# Patient Record
Sex: FEMALE | Race: WHITE | Employment: OTHER
[De-identification: names, ages, dates, MRNs, and addresses within clinical notes are randomized per-mention and may not be internally consistent; named-entity substitution may affect disease eponyms.]

---

## 2017-01-15 RX ORDER — PANTOPRAZOLE SODIUM 20 MG/1
TABLET, DELAYED RELEASE ORAL
Qty: 90 TABLET | Refills: 0 | Status: SHIPPED | OUTPATIENT
Start: 2017-01-15 | End: 2017-04-21 | Stop reason: SDUPTHER

## 2017-01-24 ENCOUNTER — OFFICE VISIT (OUTPATIENT)
Dept: FAMILY MEDICINE CLINIC | Facility: CLINIC | Age: 62
End: 2017-01-24

## 2017-01-24 VITALS
WEIGHT: 114 LBS | SYSTOLIC BLOOD PRESSURE: 98 MMHG | BODY MASS INDEX: 21.54 KG/M2 | OXYGEN SATURATION: 97 % | HEART RATE: 77 BPM | DIASTOLIC BLOOD PRESSURE: 62 MMHG

## 2017-01-24 DIAGNOSIS — R05.9 COUGH: Primary | ICD-10-CM

## 2017-01-24 DIAGNOSIS — R06.02 SHORTNESS OF BREATH: ICD-10-CM

## 2017-01-24 DIAGNOSIS — M54.6 ACUTE BILATERAL THORACIC BACK PAIN: ICD-10-CM

## 2017-01-24 PROCEDURE — 99213 OFFICE O/P EST LOW 20 MIN: CPT | Performed by: FAMILY MEDICINE

## 2017-01-24 ASSESSMENT — ENCOUNTER SYMPTOMS: BACK PAIN: 1

## 2017-01-25 ENCOUNTER — TELEPHONE (OUTPATIENT)
Dept: FAMILY MEDICINE CLINIC | Facility: CLINIC | Age: 62
End: 2017-01-25

## 2017-02-05 ASSESSMENT — ENCOUNTER SYMPTOMS
WHEEZING: 0
SHORTNESS OF BREATH: 0
COUGH: 1
CONSTIPATION: 0
PHOTOPHOBIA: 0
ABDOMINAL DISTENTION: 0
VOMITING: 0
NAUSEA: 0
DIARRHEA: 0
ABDOMINAL PAIN: 0
TROUBLE SWALLOWING: 0
COLOR CHANGE: 0

## 2017-02-14 RX ORDER — TRAMADOL HYDROCHLORIDE 50 MG/1
50 TABLET ORAL EVERY 6 HOURS PRN
Qty: 120 TABLET | Refills: 0 | Status: SHIPPED | OUTPATIENT
Start: 2017-02-14 | End: 2017-02-28 | Stop reason: SDUPTHER

## 2017-02-28 RX ORDER — TRAMADOL HYDROCHLORIDE 50 MG/1
50 TABLET ORAL EVERY 6 HOURS PRN
Qty: 120 TABLET | Refills: 0 | Status: SHIPPED | OUTPATIENT
Start: 2017-02-28 | End: 2017-03-30 | Stop reason: SDUPTHER

## 2017-03-30 RX ORDER — TRAMADOL HYDROCHLORIDE 50 MG/1
50 TABLET ORAL EVERY 6 HOURS PRN
Qty: 120 TABLET | Refills: 0 | Status: SHIPPED | OUTPATIENT
Start: 2017-03-30 | End: 2017-05-09 | Stop reason: SDUPTHER

## 2017-04-10 ENCOUNTER — HOSPITAL ENCOUNTER (EMERGENCY)
Age: 62
Discharge: HOME | End: 2017-04-10
Attending: EMERGENCY MEDICINE
Payer: COMMERCIAL

## 2017-04-10 VITALS
OXYGEN SATURATION: 95 % | DIASTOLIC BLOOD PRESSURE: 85 MMHG | RESPIRATION RATE: 20 BRPM | TEMPERATURE: 97.7 F | SYSTOLIC BLOOD PRESSURE: 146 MMHG | HEART RATE: 69 BPM

## 2017-04-10 DIAGNOSIS — S01.01XA SCALP LACERATION, INITIAL ENCOUNTER: Primary | ICD-10-CM

## 2017-04-10 PROCEDURE — 90715 TDAP VACCINE 7 YRS/> IM: CPT | Performed by: EMERGENCY MEDICINE

## 2017-04-10 PROCEDURE — 6360000002 HC RX W HCPCS: Performed by: EMERGENCY MEDICINE

## 2017-04-10 PROCEDURE — 6370000000 HC RX 637 (ALT 250 FOR IP): Performed by: EMERGENCY MEDICINE

## 2017-04-10 PROCEDURE — 12001 RPR S/N/AX/GEN/TRNK 2.5CM/<: CPT

## 2017-04-10 PROCEDURE — 6370000000 HC RX 637 (ALT 250 FOR IP)

## 2017-04-10 PROCEDURE — 2500000003 HC RX 250 WO HCPCS: Performed by: EMERGENCY MEDICINE

## 2017-04-10 PROCEDURE — 90471 IMMUNIZATION ADMIN: CPT | Performed by: EMERGENCY MEDICINE

## 2017-04-10 PROCEDURE — 99282 EMERGENCY DEPT VISIT SF MDM: CPT

## 2017-04-10 RX ORDER — LIDOCAINE HYDROCHLORIDE 10 MG/ML
5 INJECTION, SOLUTION INFILTRATION; PERINEURAL ONCE
Status: COMPLETED | OUTPATIENT
Start: 2017-04-10 | End: 2017-04-10

## 2017-04-10 RX ORDER — BACITRACIN, NEOMYCIN, POLYMYXIN B 400; 3.5; 5 [USP'U]/G; MG/G; [USP'U]/G
OINTMENT TOPICAL ONCE
Status: COMPLETED | OUTPATIENT
Start: 2017-04-10 | End: 2017-04-10

## 2017-04-10 RX ORDER — ACETAMINOPHEN 325 MG/1
650 TABLET ORAL ONCE
Status: COMPLETED | OUTPATIENT
Start: 2017-04-10 | End: 2017-04-10

## 2017-04-10 RX ORDER — ACETAMINOPHEN 325 MG/1
TABLET ORAL
Status: COMPLETED
Start: 2017-04-10 | End: 2017-04-10

## 2017-04-10 RX ADMIN — BACITRACIN, NEOMYCIN, POLYMYXIN B 3.5 G: 400; 3.5; 5 OINTMENT TOPICAL at 21:29

## 2017-04-10 RX ADMIN — TETANUS TOXOID, REDUCED DIPHTHERIA TOXOID AND ACELLULAR PERTUSSIS VACCINE, ADSORBED 0.5 ML: 5; 2.5; 8; 8; 2.5 SUSPENSION INTRAMUSCULAR at 21:13

## 2017-04-10 RX ADMIN — ACETAMINOPHEN 650 MG: 325 TABLET ORAL at 22:30

## 2017-04-10 RX ADMIN — ACETAMINOPHEN 650 MG: 325 TABLET, FILM COATED ORAL at 22:30

## 2017-04-10 RX ADMIN — LIDOCAINE HYDROCHLORIDE 5 ML: 10 INJECTION, SOLUTION INFILTRATION; PERINEURAL at 21:28

## 2017-04-10 ASSESSMENT — PAIN SCALES - GENERAL
PAINLEVEL_OUTOF10: 0
PAINLEVEL_OUTOF10: 2

## 2017-04-21 RX ORDER — PANTOPRAZOLE SODIUM 20 MG/1
TABLET, DELAYED RELEASE ORAL
Qty: 90 TABLET | Refills: 2 | Status: SHIPPED | OUTPATIENT
Start: 2017-04-21 | End: 2017-12-06 | Stop reason: SDUPTHER

## 2017-05-28 RX ORDER — AMLODIPINE BESYLATE 5 MG/1
TABLET ORAL
Qty: 90 TABLET | Refills: 1 | Status: SHIPPED | OUTPATIENT
Start: 2017-05-28 | End: 2017-11-10 | Stop reason: SDUPTHER

## 2017-06-28 RX ORDER — TRAMADOL HYDROCHLORIDE 50 MG/1
TABLET ORAL
Qty: 120 TABLET | Refills: 1 | Status: SHIPPED | OUTPATIENT
Start: 2017-06-28 | End: 2017-07-27 | Stop reason: SDUPTHER

## 2017-07-03 ENCOUNTER — OFFICE VISIT (OUTPATIENT)
Dept: FAMILY MEDICINE CLINIC | Age: 62
End: 2017-07-03
Payer: COMMERCIAL

## 2017-07-03 VITALS
TEMPERATURE: 98.1 F | SYSTOLIC BLOOD PRESSURE: 128 MMHG | OXYGEN SATURATION: 95 % | HEART RATE: 62 BPM | DIASTOLIC BLOOD PRESSURE: 70 MMHG | WEIGHT: 108 LBS | HEIGHT: 61 IN | BODY MASS INDEX: 20.39 KG/M2

## 2017-07-03 DIAGNOSIS — L24.9 IRRITANT CONTACT DERMATITIS, UNSPECIFIED TRIGGER: Primary | ICD-10-CM

## 2017-07-03 PROCEDURE — 99213 OFFICE O/P EST LOW 20 MIN: CPT | Performed by: NURSE PRACTITIONER

## 2017-07-03 RX ORDER — PREDNISONE 20 MG/1
40 TABLET ORAL DAILY
Qty: 6 TABLET | Refills: 0 | Status: SHIPPED | OUTPATIENT
Start: 2017-07-03 | End: 2017-07-06

## 2017-07-03 ASSESSMENT — ENCOUNTER SYMPTOMS
SHORTNESS OF BREATH: 0
COUGH: 0

## 2017-07-07 ENCOUNTER — OFFICE VISIT (OUTPATIENT)
Dept: FAMILY MEDICINE CLINIC | Age: 62
End: 2017-07-07
Payer: COMMERCIAL

## 2017-07-07 VITALS
OXYGEN SATURATION: 97 % | DIASTOLIC BLOOD PRESSURE: 70 MMHG | WEIGHT: 112 LBS | BODY MASS INDEX: 21.16 KG/M2 | HEART RATE: 84 BPM | TEMPERATURE: 98.2 F | SYSTOLIC BLOOD PRESSURE: 122 MMHG

## 2017-07-07 DIAGNOSIS — L24.89 IRRITANT CONTACT DERMATITIS DUE TO OTHER AGENTS: Primary | ICD-10-CM

## 2017-07-07 PROCEDURE — 99213 OFFICE O/P EST LOW 20 MIN: CPT | Performed by: NURSE PRACTITIONER

## 2017-07-07 ASSESSMENT — ENCOUNTER SYMPTOMS: COUGH: 0

## 2017-07-27 RX ORDER — TRAMADOL HYDROCHLORIDE 50 MG/1
50 TABLET ORAL EVERY 6 HOURS PRN
Qty: 120 TABLET | Refills: 1 | Status: SHIPPED | OUTPATIENT
Start: 2017-07-27 | End: 2017-10-25 | Stop reason: SDUPTHER

## 2017-07-31 ENCOUNTER — HOSPITAL ENCOUNTER (EMERGENCY)
Age: 62
Discharge: HOME | End: 2017-07-31
Attending: FAMILY MEDICINE
Payer: COMMERCIAL

## 2017-07-31 ENCOUNTER — TELEPHONE (OUTPATIENT)
Dept: FAMILY MEDICINE CLINIC | Age: 62
End: 2017-07-31

## 2017-07-31 ENCOUNTER — OFFICE VISIT (OUTPATIENT)
Dept: PRIMARY CARE CLINIC | Age: 62
End: 2017-07-31
Payer: COMMERCIAL

## 2017-07-31 VITALS
BODY MASS INDEX: 20.77 KG/M2 | RESPIRATION RATE: 18 BRPM | WEIGHT: 110 LBS | DIASTOLIC BLOOD PRESSURE: 94 MMHG | HEART RATE: 70 BPM | TEMPERATURE: 98.2 F | SYSTOLIC BLOOD PRESSURE: 151 MMHG | OXYGEN SATURATION: 97 % | HEIGHT: 61 IN

## 2017-07-31 DIAGNOSIS — R10.31 GROIN PAIN, RIGHT: ICD-10-CM

## 2017-07-31 DIAGNOSIS — R10.31 RIGHT LOWER QUADRANT ABDOMINAL PAIN: Primary | ICD-10-CM

## 2017-07-31 DIAGNOSIS — Z53.21 PATIENT LEFT WITHOUT BEING SEEN: Primary | ICD-10-CM

## 2017-07-31 LAB
BILIRUBIN, POC: ABNORMAL
BLOOD URINE, POC: ABNORMAL
CLARITY, POC: CLEAR
COLOR, POC: YELLOW
GLUCOSE URINE, POC: ABNORMAL
KETONES, POC: ABNORMAL
LEUKOCYTE EST, POC: ABNORMAL
NITRITE, POC: ABNORMAL
PH, POC: 5.5
PROTEIN, POC: ABNORMAL
SPECIFIC GRAVITY, POC: 1
UROBILINOGEN, POC: 0.2

## 2017-07-31 PROCEDURE — 81003 URINALYSIS AUTO W/O SCOPE: CPT | Performed by: NURSE PRACTITIONER

## 2017-07-31 RX ORDER — CYCLOBENZAPRINE HCL 5 MG
5 TABLET ORAL 3 TIMES DAILY PRN
Qty: 10 TABLET | Refills: 0 | Status: SHIPPED | OUTPATIENT
Start: 2017-07-31 | End: 2017-08-10

## 2017-07-31 ASSESSMENT — ENCOUNTER SYMPTOMS
BACK PAIN: 0
SORE THROAT: 0
CONSTIPATION: 1
RHINORRHEA: 0
SHORTNESS OF BREATH: 0
DIARRHEA: 0
WHEEZING: 0
COUGH: 0
VOMITING: 1
ABDOMINAL PAIN: 0
NAUSEA: 0

## 2017-08-02 ENCOUNTER — HOSPITAL ENCOUNTER (OUTPATIENT)
Age: 62
Discharge: HOME | End: 2017-08-02
Payer: COMMERCIAL

## 2017-08-02 ENCOUNTER — HOSPITAL ENCOUNTER (OUTPATIENT)
Age: 62
End: 2017-08-02
Payer: COMMERCIAL

## 2017-08-02 ENCOUNTER — HOSPITAL ENCOUNTER (OUTPATIENT)
Dept: CT IMAGING | Age: 62
Discharge: HOME | End: 2017-08-02
Payer: COMMERCIAL

## 2017-08-02 ENCOUNTER — HOSPITAL ENCOUNTER (OUTPATIENT)
Dept: MAMMOGRAPHY | Age: 62
Discharge: HOME | End: 2017-08-02
Payer: COMMERCIAL

## 2017-08-02 ENCOUNTER — OFFICE VISIT (OUTPATIENT)
Dept: FAMILY MEDICINE CLINIC | Age: 62
End: 2017-08-02
Payer: COMMERCIAL

## 2017-08-02 VITALS
OXYGEN SATURATION: 96 % | DIASTOLIC BLOOD PRESSURE: 68 MMHG | BODY MASS INDEX: 20.41 KG/M2 | WEIGHT: 108 LBS | HEART RATE: 72 BPM | SYSTOLIC BLOOD PRESSURE: 104 MMHG

## 2017-08-02 DIAGNOSIS — R10.33 PERIUMBILICAL ABDOMINAL PAIN: ICD-10-CM

## 2017-08-02 DIAGNOSIS — R10.33 PERIUMBILICAL ABDOMINAL PAIN: Primary | ICD-10-CM

## 2017-08-02 DIAGNOSIS — Z12.39 SCREENING FOR BREAST CANCER: ICD-10-CM

## 2017-08-02 DIAGNOSIS — L98.9 SKIN LESION: ICD-10-CM

## 2017-08-02 LAB
ABSOLUTE EOS #: 0.2 K/UL (ref 0–0.4)
ABSOLUTE LYMPH #: 2.5 K/UL (ref 1.1–2.7)
ABSOLUTE MONO #: 0.5 K/UL (ref 0–1)
ALBUMIN SERPL-MCNC: 4.1 G/DL (ref 3.5–5.2)
ALBUMIN/GLOBULIN RATIO: ABNORMAL (ref 1–2.5)
ALP BLD-CCNC: 144 U/L (ref 35–104)
ALT SERPL-CCNC: 8 U/L (ref 5–33)
ANION GAP SERPL CALCULATED.3IONS-SCNC: 11 MMOL/L (ref 9–17)
AST SERPL-CCNC: 25 U/L
BASOPHILS # BLD: 1 %
BASOPHILS ABSOLUTE: 0 K/UL (ref 0–0.2)
BILIRUB SERPL-MCNC: 0.29 MG/DL (ref 0.3–1.2)
BUN BLDV-MCNC: 14 MG/DL (ref 8–23)
BUN/CREAT BLD: 25 (ref 9–20)
CALCIUM SERPL-MCNC: 9.4 MG/DL (ref 8.6–10.4)
CHLORIDE BLD-SCNC: 98 MMOL/L (ref 98–107)
CO2: 28 MMOL/L (ref 20–31)
CREAT SERPL-MCNC: 0.56 MG/DL (ref 0.5–0.9)
DIFFERENTIAL TYPE: YES
EOSINOPHILS RELATIVE PERCENT: 2 %
GFR AFRICAN AMERICAN: >60 ML/MIN
GFR NON-AFRICAN AMERICAN: >60 ML/MIN
GFR SERPL CREATININE-BSD FRML MDRD: ABNORMAL ML/MIN/{1.73_M2}
GFR SERPL CREATININE-BSD FRML MDRD: ABNORMAL ML/MIN/{1.73_M2}
GLUCOSE BLD-MCNC: 100 MG/DL (ref 70–99)
HCT VFR BLD CALC: 51.3 % (ref 36–46)
HEMOGLOBIN: 17.3 G/DL (ref 12–16)
LYMPHOCYTES # BLD: 34 %
MCH RBC QN AUTO: 31.4 PG (ref 26–34)
MCHC RBC AUTO-ENTMCNC: 33.6 G/DL (ref 31–37)
MCV RBC AUTO: 93.4 FL (ref 80–100)
MONOCYTES # BLD: 7 %
PDW BLD-RTO: 14.1 % (ref 12.1–15.2)
PLATELET # BLD: 244 K/UL (ref 140–450)
PLATELET ESTIMATE: ABNORMAL
PMV BLD AUTO: ABNORMAL FL (ref 6–12)
POTASSIUM SERPL-SCNC: 3.8 MMOL/L (ref 3.7–5.3)
RBC # BLD: 5.5 M/UL (ref 4–5.2)
RBC # BLD: ABNORMAL 10*6/UL
SEG NEUTROPHILS: 56 %
SEGMENTED NEUTROPHILS ABSOLUTE COUNT: 4.2 K/UL (ref 2.5–7)
SODIUM BLD-SCNC: 137 MMOL/L (ref 135–144)
TOTAL PROTEIN: 7.4 G/DL (ref 6.4–8.3)
WBC # BLD: 7.4 K/UL (ref 3.5–11)
WBC # BLD: ABNORMAL 10*3/UL

## 2017-08-02 PROCEDURE — 99213 OFFICE O/P EST LOW 20 MIN: CPT | Performed by: FAMILY MEDICINE

## 2017-08-02 PROCEDURE — 36415 COLL VENOUS BLD VENIPUNCTURE: CPT

## 2017-08-02 PROCEDURE — 80053 COMPREHEN METABOLIC PANEL: CPT

## 2017-08-02 PROCEDURE — 85025 COMPLETE CBC W/AUTO DIFF WBC: CPT

## 2017-08-02 PROCEDURE — G0202 SCR MAMMO BI INCL CAD: HCPCS

## 2017-08-02 PROCEDURE — 74177 CT ABD & PELVIS W/CONTRAST: CPT

## 2017-08-02 PROCEDURE — 6360000004 HC RX CONTRAST MEDICATION: Performed by: FAMILY MEDICINE

## 2017-08-02 RX ADMIN — IOVERSOL 75 ML: 741 INJECTION INTRA-ARTERIAL; INTRAVENOUS at 13:41

## 2017-08-02 ASSESSMENT — ENCOUNTER SYMPTOMS
HEMATOCHEZIA: 0
VOMITING: 0
DIARRHEA: 0
ABDOMINAL PAIN: 1
CONSTIPATION: 1
NAUSEA: 0

## 2017-08-03 ENCOUNTER — TELEPHONE (OUTPATIENT)
Dept: FAMILY MEDICINE CLINIC | Age: 62
End: 2017-08-03

## 2017-08-03 DIAGNOSIS — D58.2 ELEVATED HEMOGLOBIN (HCC): ICD-10-CM

## 2017-08-03 DIAGNOSIS — R74.8 ELEVATED ALKALINE PHOSPHATASE LEVEL: Primary | ICD-10-CM

## 2017-08-04 ENCOUNTER — TELEPHONE (OUTPATIENT)
Dept: FAMILY MEDICINE CLINIC | Age: 62
End: 2017-08-04

## 2017-08-09 ENCOUNTER — TELEPHONE (OUTPATIENT)
Dept: FAMILY MEDICINE CLINIC | Age: 62
End: 2017-08-09

## 2017-08-09 RX ORDER — METHYLPREDNISOLONE 4 MG/1
TABLET ORAL
Qty: 21 TABLET | Refills: 0 | Status: SHIPPED | OUTPATIENT
Start: 2017-08-09 | End: 2017-08-15

## 2017-08-13 ASSESSMENT — ENCOUNTER SYMPTOMS
ABDOMINAL DISTENTION: 0
SHORTNESS OF BREATH: 0
COUGH: 0
BACK PAIN: 0
TROUBLE SWALLOWING: 0
PHOTOPHOBIA: 0
COLOR CHANGE: 0
WHEEZING: 0

## 2017-10-25 RX ORDER — TRAMADOL HYDROCHLORIDE 50 MG/1
50 TABLET ORAL EVERY 6 HOURS PRN
Qty: 120 TABLET | Refills: 1 | Status: SHIPPED | OUTPATIENT
Start: 2017-10-25 | End: 2017-12-06 | Stop reason: SDUPTHER

## 2017-10-25 NOTE — TELEPHONE ENCOUNTER
Patient called in asking for Ultram      Please send to    Last OV:08/02/2017    Future appointment: NONE    [phone]- please let patient know    Health Maintenance   Topic Date Due    Hepatitis C screen  1955    HIV screen  01/10/1969    Pneumococcal med risk (1 of 1 - PPSV23) 01/10/1973    Cervical cancer screen  01/10/1975    Lipid screen  01/10/1994    Zostavax vaccine  01/10/2014    Flu vaccine (1) 09/01/2017    Breast cancer screen  08/02/2019    DTaP/Tdap/Td vaccine (2 - Td) 04/10/2027    Colon cancer screen colonoscopy  05/22/2027             (applicable per patient's age: Cancer Screenings, Depression Screening, Fall Risk Screening, Immunizations)    AST (U/L)   Date Value   08/02/2017 25     ALT (U/L)   Date Value   08/02/2017 8     BUN (mg/dL)   Date Value   08/02/2017 14      (goal A1C is < 7)   (goal LDL is <100) need 30-50% reduction from baseline     BP Readings from Last 3 Encounters:   08/02/17 104/68   07/31/17 (!) 151/94   07/07/17 122/70    (goal /80)      All Future Testing planned in [facility]:  Lab Frequency Next Occurrence   Hepatic Function Panel Once 08/03/2017       Next Visit Date:  No future appointments.          Patient Active Problem List:     Hydronephrosis, right     Dysthymia     Arthritis     Amaurosis fugax     History of laser assisted in situ keratomileusis

## 2017-11-10 RX ORDER — TRAMADOL HYDROCHLORIDE 50 MG/1
50 TABLET ORAL EVERY 6 HOURS PRN
Qty: 120 TABLET | Refills: 0 | Status: SHIPPED | OUTPATIENT
Start: 2017-11-10 | End: 2017-12-06 | Stop reason: SDUPTHER

## 2017-11-10 RX ORDER — AMLODIPINE BESYLATE 5 MG/1
TABLET ORAL
Qty: 90 TABLET | Refills: 1 | Status: SHIPPED | OUTPATIENT
Start: 2017-11-10 | End: 2017-12-06 | Stop reason: SDUPTHER

## 2017-11-10 NOTE — TELEPHONE ENCOUNTER
Looks like on 10-25-17 a rx was sent to [facility] but [facility] always uses [facility].    The RX was not filled at  so this is now pending to [facility]

## 2017-12-06 ENCOUNTER — OFFICE VISIT (OUTPATIENT)
Dept: FAMILY MEDICINE CLINIC | Age: 62
End: 2017-12-06
Payer: COMMERCIAL

## 2017-12-06 VITALS
DIASTOLIC BLOOD PRESSURE: 78 MMHG | SYSTOLIC BLOOD PRESSURE: 120 MMHG | BODY MASS INDEX: 20.78 KG/M2 | WEIGHT: 110 LBS | HEART RATE: 70 BPM | OXYGEN SATURATION: 97 %

## 2017-12-06 DIAGNOSIS — J01.40 ACUTE NON-RECURRENT PANSINUSITIS: ICD-10-CM

## 2017-12-06 DIAGNOSIS — J40 BRONCHITIS: Primary | ICD-10-CM

## 2017-12-06 PROCEDURE — 99213 OFFICE O/P EST LOW 20 MIN: CPT | Performed by: FAMILY MEDICINE

## 2017-12-06 RX ORDER — AMLODIPINE BESYLATE 5 MG/1
TABLET ORAL
Qty: 90 TABLET | Refills: 1 | Status: SHIPPED | OUTPATIENT
Start: 2017-12-06 | End: 2018-07-27 | Stop reason: SDUPTHER

## 2017-12-06 RX ORDER — BENZONATATE 100 MG/1
100 CAPSULE ORAL 3 TIMES DAILY PRN
Qty: 21 CAPSULE | Refills: 0 | Status: SHIPPED | OUTPATIENT
Start: 2017-12-06 | End: 2017-12-13

## 2017-12-06 RX ORDER — TRAMADOL HYDROCHLORIDE 50 MG/1
50 TABLET ORAL EVERY 6 HOURS PRN
Qty: 120 TABLET | Refills: 1 | Status: SHIPPED | OUTPATIENT
Start: 2017-12-06 | End: 2018-03-13 | Stop reason: SDUPTHER

## 2017-12-06 RX ORDER — PANTOPRAZOLE SODIUM 20 MG/1
TABLET, DELAYED RELEASE ORAL
Qty: 90 TABLET | Refills: 2 | Status: SHIPPED | OUTPATIENT
Start: 2017-12-06 | End: 2020-07-20 | Stop reason: SDUPTHER

## 2017-12-06 RX ORDER — AZITHROMYCIN 250 MG/1
TABLET, FILM COATED ORAL
Qty: 1 PACKET | Refills: 0 | Status: SHIPPED | OUTPATIENT
Start: 2017-12-06 | End: 2017-12-16

## 2017-12-17 ASSESSMENT — ENCOUNTER SYMPTOMS
NAUSEA: 0
SINUS PRESSURE: 1
COLOR CHANGE: 0
HOARSE VOICE: 0
SINUS PAIN: 1
DIARRHEA: 0
VOMITING: 0
WHEEZING: 0
RHINORRHEA: 1
PHOTOPHOBIA: 0
BACK PAIN: 0
ABDOMINAL DISTENTION: 0
SHORTNESS OF BREATH: 0
TROUBLE SWALLOWING: 0
COUGH: 1
CONSTIPATION: 0
ABDOMINAL PAIN: 0

## 2017-12-18 NOTE — PROGRESS NOTES
Negative for polydipsia, polyphagia and polyuria. Musculoskeletal: Negative for arthralgias, back pain, gait problem, joint swelling, myalgias, neck pain and neck stiffness. Skin: Negative for color change and rash. Neurological: Positive for headaches. Negative for dizziness, syncope, weakness and light-headedness. Physical Exam:     Physical Exam   Constitutional: She is oriented to person, place, and time. She appears well-developed and well-nourished. HENT:   Head: Normocephalic and atraumatic. Right Ear: External ear normal.   Left Ear: External ear normal.   Nose: Mucosal edema and rhinorrhea present. Right sinus exhibits maxillary sinus tenderness and frontal sinus tenderness. Left sinus exhibits maxillary sinus tenderness and frontal sinus tenderness. Eyes: Conjunctivae and EOM are normal.   Neck: Normal range of motion. Neck supple. No thyromegaly present. Cardiovascular: Normal rate, regular rhythm and normal heart sounds. Exam reveals no gallop and no friction rub. No murmur heard. Pulmonary/Chest: Effort normal. No respiratory distress. She has no wheezes. She has no rales. She exhibits no tenderness. Bronchial breath sounds with prolonged expiratory phase   Abdominal: Soft. Bowel sounds are normal. She exhibits no distension. There is no tenderness. There is no rebound and no guarding. Musculoskeletal: Normal range of motion. She exhibits no edema. Lymphadenopathy:     She has no cervical adenopathy. Neurological: She is alert and oriented to person, place, and time. She exhibits normal muscle tone. Coordination normal.   Skin: Skin is warm and dry. No rash noted. No erythema. Nursing note and vitals reviewed.       Vitals:  /78   Pulse 70   Wt 110 lb (49.9 kg)   LMP 11/25/2003 (Approximate)   SpO2 97%   BMI 20.78 kg/m²       Data:     Lab Results   Component Value Date     08/02/2017    K 3.8 08/02/2017    CL 98 08/02/2017    CO2 28 08/02/2017    BUN 14 08/02/2017    CREATININE 0.56 08/02/2017    GLUCOSE 100 08/02/2017    PROT 7.4 08/02/2017    LABALBU 4.1 08/02/2017    BILITOT 0.29 08/02/2017    ALKPHOS 144 08/02/2017    AST 25 08/02/2017    ALT 8 08/02/2017     Lab Results   Component Value Date    WBC 7.4 08/02/2017    RBC 5.50 08/02/2017    HGB 17.3 08/02/2017    HCT 51.3 08/02/2017    MCV 93.4 08/02/2017    MCH 31.4 08/02/2017    MCHC 33.6 08/02/2017    RDW 14.1 08/02/2017     08/02/2017    MPV NOT REPORTED 08/02/2017     Lab Results   Component Value Date    TSH 2.29 08/24/2015     No results found for: CHOL, HDL, PSA, LABA1C       Assessment:       1. Bronchitis     2. Acute non-recurrent pansinusitis         Plan:   1. Bronchitis-Rx azithromycin. Recommend Tylenol/ibuprofen as needed for pain/fever. Recommend increased fluid intake. Follow-up if not improving. 2.  Acute pansinusitis-Rx azithromycin, Rx Tessalon for symptomatic relief of cough. Recommend Tylenol/ibuprofen as needed for pain/fever. Recommend increased fluid intake. Follow-up if not improving. Refills of patient's chronic medications sent in for patient            Completed Refills   Requested Prescriptions     Signed Prescriptions Disp Refills    amLODIPine (NORVASC) 5 MG tablet 90 tablet 1     Sig: Take 1 tablet by mouth  daily    traMADol (ULTRAM) 50 MG tablet 120 tablet 1     Sig: Take 1 tablet by mouth every 6 hours as needed for Pain .  pantoprazole (PROTONIX) 20 MG tablet 90 tablet 2     Sig: Take 1 tablet by mouth  daily    azithromycin (ZITHROMAX) 250 MG tablet 1 packet 0     Sig: Take 2 tabs (500 mg) on Day 1, and take 1 tab (250 mg) on days 2 through 5.    benzonatate (TESSALON PERLES) 100 MG capsule 21 capsule 0     Sig: Take 1 capsule by mouth 3 times daily as needed for Cough     Return if symptoms worsen or fail to improve.   Orders Placed This Encounter   Medications    amLODIPine (NORVASC) 5 MG tablet     Sig: Take 1 tablet by mouth  daily

## 2018-02-13 ENCOUNTER — HOSPITAL ENCOUNTER (OUTPATIENT)
Dept: GENERAL RADIOLOGY | Age: 63
Discharge: HOME | End: 2018-02-15
Payer: COMMERCIAL

## 2018-02-13 ENCOUNTER — HOSPITAL ENCOUNTER (OUTPATIENT)
Age: 63
Discharge: HOME | End: 2018-02-15
Payer: COMMERCIAL

## 2018-02-13 ENCOUNTER — OFFICE VISIT (OUTPATIENT)
Dept: FAMILY MEDICINE CLINIC | Age: 63
End: 2018-02-13
Payer: COMMERCIAL

## 2018-02-13 VITALS
OXYGEN SATURATION: 97 % | SYSTOLIC BLOOD PRESSURE: 124 MMHG | WEIGHT: 110 LBS | BODY MASS INDEX: 20.78 KG/M2 | HEART RATE: 72 BPM | DIASTOLIC BLOOD PRESSURE: 70 MMHG

## 2018-02-13 DIAGNOSIS — R10.9 FLANK PAIN: Primary | ICD-10-CM

## 2018-02-13 DIAGNOSIS — R10.9 FLANK PAIN: ICD-10-CM

## 2018-02-13 LAB
BILIRUBIN, POC: NORMAL
BLOOD URINE, POC: NORMAL
CLARITY, POC: NORMAL
COLOR, POC: NORMAL
GLUCOSE URINE, POC: NORMAL
KETONES, POC: NORMAL
LEUKOCYTE EST, POC: NORMAL
NITRITE, POC: NORMAL
PH, POC: 7.5
PROTEIN, POC: NORMAL
SPECIFIC GRAVITY, POC: 1
UROBILINOGEN, POC: 0.2

## 2018-02-13 PROCEDURE — G0444 DEPRESSION SCREEN ANNUAL: HCPCS | Performed by: FAMILY MEDICINE

## 2018-02-13 PROCEDURE — 99213 OFFICE O/P EST LOW 20 MIN: CPT | Performed by: FAMILY MEDICINE

## 2018-02-13 PROCEDURE — 74018 RADEX ABDOMEN 1 VIEW: CPT

## 2018-02-13 PROCEDURE — 81003 URINALYSIS AUTO W/O SCOPE: CPT | Performed by: FAMILY MEDICINE

## 2018-02-13 PROCEDURE — 73502 X-RAY EXAM HIP UNI 2-3 VIEWS: CPT

## 2018-02-13 ASSESSMENT — PATIENT HEALTH QUESTIONNAIRE - PHQ9
1. LITTLE INTEREST OR PLEASURE IN DOING THINGS: 2
SUM OF ALL RESPONSES TO PHQ9 QUESTIONS 1 & 2: 4
2. FEELING DOWN, DEPRESSED OR HOPELESS: 2
SUM OF ALL RESPONSES TO PHQ QUESTIONS 1-9: 4

## 2018-02-13 NOTE — LETTER
[address]  [place]  [place] [address]  Phone: [phone]  Fax: [phone] [facility],         February 13, 2018     Patient: Ronna Morris   YOB: 1955   Date of Visit: 2/13/2018       To Whom It May Concern: It is my medical opinion that Livan Kaur has significant back pain which would make it difficult for her to serve on jury duty. If you have any questions or concerns, please don't hesitate to call.     Sincerely,        Fam Garcia, DO

## 2018-02-13 NOTE — LETTER
[address]  [place] [address]  Phone: [phone]  Fax: [phone] [facility],         February 13, 2018     Patient: Cynthia Matta   YOB: 1955   Date of Visit: 2/13/2018       To Whom It May Concern: It is my medical opinion that Ashwini Ibarra has significant back pakin which would make it difficult for her to serve on jury duty. .    If you have any questions or concerns, please don't hesitate to call.     Sincerely,        Tri Abreu DO

## 2018-02-15 ENCOUNTER — TELEPHONE (OUTPATIENT)
Dept: FAMILY MEDICINE CLINIC | Age: 63
End: 2018-02-15

## 2018-02-25 ASSESSMENT — ENCOUNTER SYMPTOMS
COLOR CHANGE: 0
WHEEZING: 0
COUGH: 0
ABDOMINAL DISTENTION: 0
DIARRHEA: 0
CONSTIPATION: 0
ABDOMINAL PAIN: 0
NAUSEA: 0
TROUBLE SWALLOWING: 0
PHOTOPHOBIA: 0
SHORTNESS OF BREATH: 0
VOMITING: 0
BACK PAIN: 0

## 2018-02-26 NOTE — PROGRESS NOTES
HPI Notes    Name: Jose A Collins  : 1955         Chief Complaint:     Chief Complaint   Patient presents with    Flank Pain     right       History of Present Illness:      Jose A Collins is a 58 y.o.  female who presents with Flank Pain (right)      Flank Pain   This is a new problem. The current episode started 1 to 4 weeks ago. The problem occurs 2 to 4 times per day. The problem has been waxing and waning since onset. The pain is present in the lumbar spine. The quality of the pain is described as aching and cramping. The pain does not radiate. The pain is mild. The pain is the same all the time. The symptoms are aggravated by twisting, bending and position. Pertinent negatives include no abdominal pain, chest pain, fever, headaches, leg pain, numbness, paresis, paresthesias or weakness. She has tried analgesics for the symptoms. The treatment provided mild relief.      No other acute problems or complaints  Past Medical History:     Past Medical History:   Diagnosis Date    Arthritis     Chronic kidney disease     Hydronephrosis    Depression     ON RX    Full dentures     Heart murmur     Hypertension     Scoliosis     chronic back pain    UPJ (ureteropelvic junction) obstruction 10/2014      Reviewed all health maintenance requirements and ordered appropriate tests  Health Maintenance Due   Topic Date Due    Hepatitis C screen  1955    HIV screen  01/10/1969    Pneumococcal med risk (1 of 1 - PPSV23) 01/10/1973    Cervical cancer screen  01/10/1975    Lipid screen  01/10/1994    Shingles Vaccine (1 of 2 - 2 Dose Series) 01/10/2004    Smoker: low dose lung CT screening  01/10/2009       Past Surgical History:     Past Surgical History:   Procedure Laterality Date    CARPAL TUNNEL RELEASE Bilateral      SECTION      COLONOSCOPY      Multiple     CYSTOSCOPY Right 2014    rt stent with right pyeloplasty    CYSTOURETHROSCOPY Bilateral 2014 with right stent exchange and retro pylogram    HAND SURGERY Right 2001    crushed    LASIK Bilateral 1994    OTHER SURGICAL HISTORY  10-16-14    Cysto; right ureteral retrograde pyelogram & stent insertion    OTHER SURGICAL HISTORY  1993    TUBAL LIGATION REVERSAL    TUBAL LIGATION  1973    VARICOSE VEIN SURGERY Bilateral         Medications:       Prior to Admission medications    Medication Sig Start Date End Date Taking? Authorizing Provider   amLODIPine (NORVASC) 5 MG tablet Take 1 tablet by mouth  daily 12/6/17  Yes Anant Shanelle, DO   traMADol (ULTRAM) 50 MG tablet Take 1 tablet by mouth every 6 hours as needed for Pain . 12/6/17  Yes Anant Shanelle, DO   pantoprazole (PROTONIX) 20 MG tablet Take 1 tablet by mouth  daily 12/6/17  Yes Anant Shanelle, DO   docusate sodium (COLACE) 100 MG capsule Take 1 capsule by mouth daily as needed for Constipation. 11/26/14  Yes Diamond Ly MD        Allergies:       Patient has no known allergies. Social History:     Tobacco:    reports that she has been smoking Cigarettes. She has a 20.00 pack-year smoking history. She has never used smokeless tobacco.  Alcohol:      reports that she drinks alcohol. Drug Use:  reports that she does not use drugs. Family History:     Family History   Problem Relation Age of Onset    Cancer Mother      COLON    Kidney Disease Father      ON DIALYSIS    Cancer Paternal Grandmother      SKIN       Review of Systems:     Positive and Negative as described in HPI    Review of Systems   Constitutional: Negative for activity change, appetite change, fever and unexpected weight change. HENT: Negative for ear discharge, ear pain and trouble swallowing. Eyes: Negative for photophobia and visual disturbance. Respiratory: Negative for cough, shortness of breath and wheezing. Cardiovascular: Negative for chest pain and palpitations.    Gastrointestinal: Negative for abdominal distention, abdominal pain, constipation, diarrhea, nausea and vomiting. Endocrine: Negative for polydipsia, polyphagia and polyuria. Genitourinary: Positive for flank pain. Musculoskeletal: Negative for arthralgias, back pain, gait problem, joint swelling, myalgias, neck pain and neck stiffness. Skin: Negative for color change and rash. Neurological: Negative for dizziness, syncope, weakness, light-headedness, numbness, headaches and paresthesias. Psychiatric/Behavioral: Negative for dysphoric mood. The patient is not nervous/anxious. Physical Exam:     Physical Exam   Constitutional: She is oriented to person, place, and time. She appears well-developed and well-nourished. HENT:   Head: Normocephalic and atraumatic. Right Ear: External ear normal.   Left Ear: External ear normal.   Eyes: Conjunctivae and EOM are normal.   Neck: Normal range of motion. Neck supple. No thyromegaly present. Cardiovascular: Normal rate, regular rhythm and normal heart sounds. Exam reveals no gallop and no friction rub. No murmur heard. Pulmonary/Chest: Effort normal and breath sounds normal. No respiratory distress. She has no wheezes. She has no rales. She exhibits no tenderness. Abdominal: Soft. Bowel sounds are normal. She exhibits no distension. There is no tenderness. There is no rebound and no guarding. Musculoskeletal: Normal range of motion. She exhibits no edema. Lymphadenopathy:     She has no cervical adenopathy. Neurological: She is alert and oriented to person, place, and time. She exhibits normal muscle tone. Coordination normal.   Skin: Skin is warm and dry. No rash noted. No erythema. Psychiatric: She has a normal mood and affect. Her behavior is normal. Judgment and thought content normal.   Nursing note and vitals reviewed.       Vitals:  /70   Pulse 72   Wt 110 lb (49.9 kg)   LMP 11/25/2003 (Approximate)   SpO2 97%   BMI 20.78 kg/m²       Data:     Lab Results   Component Value Date     08/02/2017    K 3.8 08/02/2017    CL 98 08/02/2017    CO2 28 08/02/2017    BUN 14 08/02/2017    CREATININE 0.56 08/02/2017    GLUCOSE 100 08/02/2017    PROT 7.4 08/02/2017    LABALBU 4.1 08/02/2017    BILITOT 0.29 08/02/2017    ALKPHOS 144 08/02/2017    AST 25 08/02/2017    ALT 8 08/02/2017     Lab Results   Component Value Date    WBC 7.4 08/02/2017    RBC 5.50 08/02/2017    HGB 17.3 08/02/2017    HCT 51.3 08/02/2017    MCV 93.4 08/02/2017    MCH 31.4 08/02/2017    MCHC 33.6 08/02/2017    RDW 14.1 08/02/2017     08/02/2017    MPV NOT REPORTED 08/02/2017     Lab Results   Component Value Date    TSH 2.29 08/24/2015     No results found for: CHOL, HDL, PSA, LABA1C       Assessment:       1. Flank pain  POCT Urinalysis No Micro (Auto)    XR ABDOMEN (KUB) (SINGLE AP VIEW)    XR HIP RIGHT (2-3 VIEWS)       Plan:   1. Flank pain-UA shows a small amount of blood, will get a KUB to evaluate and ensure there is no nephrolithiasis. Does not appear to be any signs of a urinary tract infection. Patient also with some chronic hip pain, a concern that this may be a manifestation of that, will get x-ray of the right hip to further evaluate. Recommend judicious use of NSAIDs. If workup is negative, it is likely that the patient has a muscle strain/sprain and heat is recommended along with rest and judicious use of NSAIDs. Management will depend upon patient's results. Completed Refills   Requested Prescriptions      No prescriptions requested or ordered in this encounter     No Follow-up on file. No orders of the defined types were placed in this encounter.     Orders Placed This Encounter   Procedures    XR ABDOMEN (KUB) (SINGLE AP VIEW)     Standing Status:   Future     Number of Occurrences:   1     Standing Expiration Date:   2/13/2019     Order Specific Question:   Reason for exam:     Answer:   right flank pain, hematuria    XR HIP RIGHT (2-3 VIEWS)     Standing Status:   Future     Number of Occurrences:   1     Standing Expiration Date:   2/13/2019     Order Specific Question:   Reason for exam:     Answer:   right hip pain, right flank pain    POCT Urinalysis No Micro (Auto)         Patient Instructions     SURVEY:    You may be receiving a survey from [employer] regarding your visit today. Please complete the survey to enable us to provide the highest quality of care to you and your family. If you cannot score us as very good on any question, please call the office to discuss how we could have made your experience exceptional.     Thank you. Electronically signed by Layne Willson DO on 2/25/2018 at 7:14 PM         Completed Refills   Requested Prescriptions      No prescriptions requested or ordered in this encounter         [facility] received counseling on the following healthy behaviors: nutrition and exercise  Reviewed prior labs and health maintenance. Continue current medications, diet and exercise. Discussed use, benefit, and side effects of prescribed medications. Barriers to medication compliance addressed. Patient given educational materials - see patient instructions. All patient questions answered. Patient voiced understanding.

## 2018-03-13 DIAGNOSIS — G89.29 CHRONIC BILATERAL LOW BACK PAIN WITHOUT SCIATICA: Primary | ICD-10-CM

## 2018-03-13 DIAGNOSIS — M54.50 CHRONIC BILATERAL LOW BACK PAIN WITHOUT SCIATICA: Primary | ICD-10-CM

## 2018-03-13 RX ORDER — TRAMADOL HYDROCHLORIDE 50 MG/1
50 TABLET ORAL EVERY 6 HOURS PRN
Qty: 120 TABLET | Refills: 1 | Status: SHIPPED | OUTPATIENT
Start: 2018-03-13 | End: 2018-05-16 | Stop reason: SDUPTHER

## 2018-05-14 ENCOUNTER — TELEPHONE (OUTPATIENT)
Dept: FAMILY MEDICINE CLINIC | Age: 63
End: 2018-05-14

## 2018-05-16 DIAGNOSIS — G89.29 CHRONIC BILATERAL LOW BACK PAIN WITHOUT SCIATICA: ICD-10-CM

## 2018-05-16 DIAGNOSIS — M54.50 CHRONIC BILATERAL LOW BACK PAIN WITHOUT SCIATICA: ICD-10-CM

## 2018-05-16 RX ORDER — TRAMADOL HYDROCHLORIDE 50 MG/1
50 TABLET ORAL EVERY 6 HOURS PRN
Qty: 120 TABLET | Refills: 0 | Status: SHIPPED | OUTPATIENT
Start: 2018-05-16 | End: 2018-07-11 | Stop reason: SDUPTHER

## 2018-05-16 RX ORDER — TRAMADOL HYDROCHLORIDE 50 MG/1
50 TABLET ORAL EVERY 6 HOURS PRN
Qty: 120 TABLET | Refills: 1 | Status: CANCELLED | OUTPATIENT
Start: 2018-05-16 | End: 2018-07-15

## 2018-07-11 ENCOUNTER — TELEPHONE (OUTPATIENT)
Dept: FAMILY MEDICINE CLINIC | Age: 63
End: 2018-07-11

## 2018-07-11 DIAGNOSIS — G89.29 CHRONIC BILATERAL LOW BACK PAIN WITHOUT SCIATICA: ICD-10-CM

## 2018-07-11 DIAGNOSIS — M54.50 CHRONIC BILATERAL LOW BACK PAIN WITHOUT SCIATICA: ICD-10-CM

## 2018-07-11 RX ORDER — TRAMADOL HYDROCHLORIDE 50 MG/1
50 TABLET ORAL EVERY 6 HOURS PRN
Qty: 60 TABLET | Refills: 0 | Status: SHIPPED | OUTPATIENT
Start: 2018-07-11 | End: 2018-08-10

## 2018-07-11 RX ORDER — TRAMADOL HYDROCHLORIDE 50 MG/1
50 TABLET ORAL EVERY 6 HOURS PRN
Qty: 120 TABLET | Refills: 0 | Status: SHIPPED | OUTPATIENT
Start: 2018-07-11 | End: 2018-08-15 | Stop reason: SDUPTHER

## 2018-07-11 NOTE — TELEPHONE ENCOUNTER
Patient asking for a new script for Tramadol - patient uses [facility] - doesn't feel that she needs to come in because Dr Chandler Mojica would just refill it for her    Health Maintenance   Topic Date Due    Hepatitis C screen  1955    HIV screen  01/10/1969    Pneumococcal med risk (1 of 1 - PPSV23) 01/10/1973    Cervical cancer screen  01/10/1975    Lipid screen  01/10/1994    Shingles Vaccine (1 of 2 - 2 Dose Series) 01/10/2004    Flu vaccine (1) 09/01/2018    Breast cancer screen  08/02/2019    DTaP/Tdap/Td vaccine (2 - Td) 04/10/2027    Colon cancer screen colonoscopy  05/22/2027             (applicable per patient's age: Cancer Screenings, Depression Screening, Fall Risk Screening, Immunizations)    AST (U/L)   Date Value   08/02/2017 25     ALT (U/L)   Date Value   08/02/2017 8     BUN (mg/dL)   Date Value   08/02/2017 14      (goal A1C is < 7)   (goal LDL is <100) need 30-50% reduction from baseline     BP Readings from Last 3 Encounters:   02/13/18 124/70   12/06/17 120/78   08/02/17 104/68    (goal /80)      All Future Testing planned in [facility]:  Lab Frequency Next Occurrence   Hepatic Function Panel Once 08/03/2017       Next Visit Date:  No future appointments.          Patient Active Problem List:     Hydronephrosis, right     Dysthymia     Arthritis     Amaurosis fugax     History of laser assisted in situ keratomileusis

## 2018-07-27 ENCOUNTER — TELEPHONE (OUTPATIENT)
Dept: FAMILY MEDICINE CLINIC | Age: 63
End: 2018-07-27

## 2018-07-27 RX ORDER — AMLODIPINE BESYLATE 5 MG/1
TABLET ORAL
Qty: 90 TABLET | Refills: 1 | Status: SHIPPED | OUTPATIENT
Start: 2018-07-27 | End: 2018-12-27 | Stop reason: SDUPTHER

## 2018-07-27 NOTE — TELEPHONE ENCOUNTER
[facility] left message requesting Amlodipine    Health Maintenance   Topic Date Due    Hepatitis C screen  1955    HIV screen  01/10/1969    Pneumococcal med risk (1 of 1 - PPSV23) 01/10/1973    Cervical cancer screen  01/10/1975    Lipid screen  01/10/1994    Shingles Vaccine (1 of 2 - 2 Dose Series) 01/10/2004    Flu vaccine (1) 09/01/2018    Breast cancer screen  08/02/2019    DTaP/Tdap/Td vaccine (2 - Td) 04/10/2027    Colon cancer screen colonoscopy  05/22/2027             (applicable per patient's age: Cancer Screenings, Depression Screening, Fall Risk Screening, Immunizations)    AST (U/L)   Date Value   08/02/2017 25     ALT (U/L)   Date Value   08/02/2017 8     BUN (mg/dL)   Date Value   08/02/2017 14      (goal A1C is < 7)   (goal LDL is <100) need 30-50% reduction from baseline     BP Readings from Last 3 Encounters:   02/13/18 124/70   12/06/17 120/78   08/02/17 104/68    (goal /80)      All Future Testing planned in [facility]:  Lab Frequency Next Occurrence   Hepatic Function Panel Once 08/03/2017       Next Visit Date:  Future Appointments  Date Time Provider Robert Francis   8/15/2018 1:00 PM DO Teja Mac [facility]            Patient Active Problem List:     Hydronephrosis, right     Dysthymia     Arthritis     Amaurosis fugax     History of laser assisted in situ keratomileusis

## 2018-08-15 ENCOUNTER — OFFICE VISIT (OUTPATIENT)
Dept: FAMILY MEDICINE CLINIC | Age: 63
End: 2018-08-15
Payer: COMMERCIAL

## 2018-08-15 ENCOUNTER — HOSPITAL ENCOUNTER (OUTPATIENT)
Age: 63
Discharge: HOME | End: 2018-08-17
Payer: COMMERCIAL

## 2018-08-15 ENCOUNTER — HOSPITAL ENCOUNTER (OUTPATIENT)
Dept: GENERAL RADIOLOGY | Age: 63
Discharge: HOME | End: 2018-08-17
Payer: COMMERCIAL

## 2018-08-15 VITALS
SYSTOLIC BLOOD PRESSURE: 120 MMHG | HEIGHT: 61 IN | WEIGHT: 113 LBS | DIASTOLIC BLOOD PRESSURE: 70 MMHG | BODY MASS INDEX: 21.34 KG/M2

## 2018-08-15 DIAGNOSIS — M54.50 CHRONIC BILATERAL LOW BACK PAIN WITHOUT SCIATICA: ICD-10-CM

## 2018-08-15 DIAGNOSIS — M41.25 OTHER IDIOPATHIC SCOLIOSIS, THORACOLUMBAR REGION: ICD-10-CM

## 2018-08-15 DIAGNOSIS — M54.50 CHRONIC BILATERAL LOW BACK PAIN WITHOUT SCIATICA: Primary | ICD-10-CM

## 2018-08-15 DIAGNOSIS — G89.29 CHRONIC BILATERAL LOW BACK PAIN WITHOUT SCIATICA: ICD-10-CM

## 2018-08-15 DIAGNOSIS — G89.29 CHRONIC BILATERAL LOW BACK PAIN WITHOUT SCIATICA: Primary | ICD-10-CM

## 2018-08-15 PROCEDURE — 72072 X-RAY EXAM THORAC SPINE 3VWS: CPT

## 2018-08-15 PROCEDURE — 72110 X-RAY EXAM L-2 SPINE 4/>VWS: CPT

## 2018-08-15 PROCEDURE — 99213 OFFICE O/P EST LOW 20 MIN: CPT | Performed by: FAMILY MEDICINE

## 2018-08-15 RX ORDER — PANTOPRAZOLE SODIUM 20 MG/1
TABLET, DELAYED RELEASE ORAL
Qty: 90 TABLET | Refills: 2 | Status: CANCELLED | OUTPATIENT
Start: 2018-08-15

## 2018-08-15 RX ORDER — TRAMADOL HYDROCHLORIDE 50 MG/1
50 TABLET ORAL EVERY 6 HOURS PRN
Qty: 120 TABLET | Refills: 0 | Status: SHIPPED | OUTPATIENT
Start: 2018-08-15 | End: 2018-10-16 | Stop reason: SDUPTHER

## 2018-08-15 ASSESSMENT — PATIENT HEALTH QUESTIONNAIRE - PHQ9
SUM OF ALL RESPONSES TO PHQ QUESTIONS 1-9: 0
SUM OF ALL RESPONSES TO PHQ QUESTIONS 1-9: 0
SUM OF ALL RESPONSES TO PHQ9 QUESTIONS 1 & 2: 0
1. LITTLE INTEREST OR PLEASURE IN DOING THINGS: 0
2. FEELING DOWN, DEPRESSED OR HOPELESS: 0

## 2018-08-15 NOTE — PATIENT INSTRUCTIONS
SURVEY:    You may be receiving a survey from [employer] regarding your visit today. Please complete the survey to enable us to provide the highest quality of care to you and your family. If you cannot score us as very good on any question, please call the office to discuss how we could have made your experience exceptional.     Thank you.

## 2018-08-15 NOTE — PROGRESS NOTES
 VARICOSE VEIN SURGERY Bilateral         Medications:       Prior to Admission medications    Medication Sig Start Date End Date Taking? Authorizing Provider   traMADol (ULTRAM) 50 MG tablet Take 1 tablet by mouth every 6 hours as needed for Pain for up to 90 days. . 8/15/18 11/13/18 Yes Yuriy Zee, DO   amLODIPine (NORVASC) 5 MG tablet Take 1 tablet by mouth  daily 7/27/18  Yes Yuriy Zee, DO   pantoprazole (PROTONIX) 20 MG tablet Take 1 tablet by mouth  daily 12/6/17  Yes Yojana Soto, DO   docusate sodium (COLACE) 100 MG capsule Take 1 capsule by mouth daily as needed for Constipation. 11/26/14  Yes Sumaya Maldonado MD        Allergies:       Patient has no known allergies. Social History:     Tobacco:    reports that she has been smoking Cigarettes. She has a 20.00 pack-year smoking history. She has never used smokeless tobacco.  Alcohol:      reports that she drinks alcohol. Drug Use:  reports that she does not use drugs. Family History:     Family History   Problem Relation Age of Onset    Cancer Mother         COLON    Kidney Disease Father         ON DIALYSIS    Cancer Paternal Grandmother         SKIN       Review of Systems:     Positive and Negative as described in HPI    Review of Systems   Constitutional: Negative. Respiratory: Negative. Cardiovascular: Negative. Gastrointestinal: Negative. Physical Exam:     Vitals:  /70   Ht 5' 1\" (1.549 m)   Wt 113 lb (51.3 kg)   LMP 11/25/2003 (Approximate)   BMI 21.35 kg/m²   Physical Exam   Constitutional: She is oriented to person, place, and time. She appears well-developed and well-nourished. No distress. HENT:   Head: Normocephalic and atraumatic. Eyes: Conjunctivae and EOM are normal.   Cardiovascular: Normal rate, regular rhythm and normal heart sounds. No peripheral edema.    Pulmonary/Chest: Effort normal and breath sounds normal.   Musculoskeletal:   Huge thoracolumbar dextrorotary scoliosis. TTP R posterior ribs approx 8-10. SI joints, ASIS's, and medial malleoli symmetric. Neurological: She is alert and oriented to person, place, and time. Skin: Skin is warm and dry. Psychiatric: She has a normal mood and affect. Judgment normal.   Nursing note and vitals reviewed. Data:     Lab Results   Component Value Date     08/02/2017    K 3.8 08/02/2017    CL 98 08/02/2017    CO2 28 08/02/2017    BUN 14 08/02/2017    CREATININE 0.56 08/02/2017    GLUCOSE 100 08/02/2017    PROT 7.4 08/02/2017    LABALBU 4.1 08/02/2017    BILITOT 0.29 08/02/2017    ALKPHOS 144 08/02/2017    AST 25 08/02/2017    ALT 8 08/02/2017     Lab Results   Component Value Date    WBC 7.4 08/02/2017    RBC 5.50 08/02/2017    HGB 17.3 08/02/2017    HCT 51.3 08/02/2017    MCV 93.4 08/02/2017    MCH 31.4 08/02/2017    MCHC 33.6 08/02/2017    RDW 14.1 08/02/2017     08/02/2017    MPV NOT REPORTED 08/02/2017     Lab Results   Component Value Date    TSH 2.29 08/24/2015     No results found for: CHOL, HDL, PSA, LABA1C      Assessment & Plan:        Diagnosis Orders   1. Chronic bilateral low back pain without sciatica  traMADol (ULTRAM) 50 MG tablet    XR THORACIC SPINE (3 VIEWS)    XR LUMBAR SPINE (MIN 4 VIEWS)   2. Other idiopathic scoliosis, thoracolumbar region      chronic pain in multiple sites, most notably on the right side of her back. Patient thinks some of this may be related to her kidneys, but I think it's more likely related to her severe scoliosis. Updating x-rays and we'll likely refer to pain management. Okay to continue tramadol as needed. Requested Prescriptions     Signed Prescriptions Disp Refills    traMADol (ULTRAM) 50 MG tablet 120 tablet 0     Sig: Take 1 tablet by mouth every 6 hours as needed for Pain for up to 90 days. .       Patient Instructions     SURVEY:    You may be receiving a survey from [employer] regarding your visit today.     Please complete the survey to enable us to provide the highest quality of care to you and your family. If you cannot score us as very good on any question, please call the office to discuss how we could have made your experience exceptional.     Thank you. Eliecer Oar received counseling on the following healthy behaviors: medication adherence  Reviewed prior labs and health maintenance. Continue current medications, diet and exercise. Discussed use, benefit, and side effects of prescribed medications. Barriers to medication compliance addressed. Patient given educational materials - see patient instructions. All patient questions answered. Patient voiced understanding.      Electronically signed by Raquel Chin DO on 8/17/2018 at 3:37 PM   [address]  [address] [place] [address] [address] [place] [id number]  Dept: [phone]

## 2018-08-17 ENCOUNTER — TELEPHONE (OUTPATIENT)
Dept: FAMILY MEDICINE CLINIC | Age: 63
End: 2018-08-17

## 2018-08-17 DIAGNOSIS — M41.25 IDIOPATHIC SCOLIOSIS OF THORACOLUMBAR REGION: Primary | ICD-10-CM

## 2018-08-17 DIAGNOSIS — M25.519 CHRONIC SHOULDER PAIN, UNSPECIFIED LATERALITY: ICD-10-CM

## 2018-08-17 DIAGNOSIS — M54.41 CHRONIC BILATERAL LOW BACK PAIN WITH BILATERAL SCIATICA: ICD-10-CM

## 2018-08-17 DIAGNOSIS — M54.42 CHRONIC BILATERAL LOW BACK PAIN WITH BILATERAL SCIATICA: ICD-10-CM

## 2018-08-17 DIAGNOSIS — G89.29 CHRONIC BILATERAL LOW BACK PAIN WITH BILATERAL SCIATICA: ICD-10-CM

## 2018-08-17 DIAGNOSIS — G89.29 CHRONIC SHOULDER PAIN, UNSPECIFIED LATERALITY: ICD-10-CM

## 2018-08-17 ASSESSMENT — ENCOUNTER SYMPTOMS
GASTROINTESTINAL NEGATIVE: 1
RESPIRATORY NEGATIVE: 1

## 2018-08-17 NOTE — TELEPHONE ENCOUNTER
----- Message from Tommy Shukla DO sent at 8/16/2018  1:42 PM EDT -----  Significant arthritis in thoracic spine.  I do think she could benefit from pain mgmt referral.

## 2018-08-17 NOTE — TELEPHONE ENCOUNTER
Patient states that she also has sciatica pain that radiates down her legs and has chronic shoulder pain.

## 2018-09-12 ENCOUNTER — TELEPHONE (OUTPATIENT)
Dept: FAMILY MEDICINE CLINIC | Age: 63
End: 2018-09-12

## 2018-09-12 RX ORDER — LEVOFLOXACIN 500 MG/1
500 TABLET, FILM COATED ORAL DAILY
Qty: 7 TABLET | Refills: 0 | Status: SHIPPED | OUTPATIENT
Start: 2018-09-12 | End: 2018-09-19

## 2018-09-12 NOTE — LETTER
[address]  [place] [address]  Phone: [phone]  Fax: [phone]    Crispin Mims DO        September 12, 2018     Patient: Oksana Feldman   YOB: 1955   Date of Visit: 9/12/2018       To Whom It May Concern: It is my medical opinion that Michael Manzanares should remain out of work until 9/17/18. If you have any questions or concerns, please don't hesitate to call.     Sincerely,        Crispin Mims DO

## 2018-09-12 NOTE — TELEPHONE ENCOUNTER
Patient feels she has bronchitis and would like to know if Dr. Tarik Mccullough can call in an antibiotic for her? Patient last seen 8/15/18. Please let patient know.     Health Maintenance   Topic Date Due    Hepatitis C screen  1955    HIV screen  01/10/1969    Pneumococcal med risk (1 of 1 - PPSV23) 01/10/1973    Cervical cancer screen  01/10/1975    Lipid screen  01/10/1994    Shingles Vaccine (1 of 2 - 2 Dose Series) 01/10/2004    Flu vaccine (1) 09/01/2018    Breast cancer screen  08/02/2019    DTaP/Tdap/Td vaccine (2 - Td) 04/10/2027    Colon cancer screen colonoscopy  05/22/2027             (applicable per patient's age: Cancer Screenings, Depression Screening, Fall Risk Screening, Immunizations)    AST (U/L)   Date Value   08/02/2017 25     ALT (U/L)   Date Value   08/02/2017 8     BUN (mg/dL)   Date Value   08/02/2017 14      (goal A1C is < 7)   (goal LDL is <100) need 30-50% reduction from baseline     BP Readings from Last 3 Encounters:   08/15/18 120/70   02/13/18 124/70   12/06/17 120/78    (goal /80)      All Future Testing planned in [facility]:  Lab Frequency Next Occurrence       Next Visit Date:  Future Appointments  Date Time Provider Robert Francis   11/15/2018 11:20 AM Daljit Miguel DO Deaaileen HARVEY [facility]            Patient Active Problem List:     Hydronephrosis, right     Dysthymia     Arthritis     Amaurosis fugax     History of laser assisted in situ keratomileusis

## 2018-09-12 NOTE — TELEPHONE ENCOUNTER
Cough, congestion, post nasal drainage, sob. Wheezing, hoarse. Feels like she has a fever off and on, but has not taken it. Patient was advised to be seen due to SOB, cough and fever. Patient would like to wait and try an antibiotic and also a cough medication. Requesting work excuse for the rest of the week, including today.

## 2018-09-17 ENCOUNTER — HOSPITAL ENCOUNTER (OUTPATIENT)
Dept: GENERAL RADIOLOGY | Age: 63
Discharge: HOME | End: 2018-09-19
Payer: COMMERCIAL

## 2018-09-17 ENCOUNTER — HOSPITAL ENCOUNTER (OUTPATIENT)
Age: 63
Discharge: HOME | End: 2018-09-19
Payer: COMMERCIAL

## 2018-09-17 ENCOUNTER — OFFICE VISIT (OUTPATIENT)
Dept: FAMILY MEDICINE CLINIC | Age: 63
End: 2018-09-17
Payer: COMMERCIAL

## 2018-09-17 VITALS
DIASTOLIC BLOOD PRESSURE: 100 MMHG | TEMPERATURE: 97.4 F | BODY MASS INDEX: 20.6 KG/M2 | HEART RATE: 91 BPM | SYSTOLIC BLOOD PRESSURE: 152 MMHG | WEIGHT: 109 LBS

## 2018-09-17 DIAGNOSIS — J40 BRONCHITIS: ICD-10-CM

## 2018-09-17 DIAGNOSIS — J44.1 COPD WITH ACUTE EXACERBATION (HCC): Primary | ICD-10-CM

## 2018-09-17 PROCEDURE — 71046 X-RAY EXAM CHEST 2 VIEWS: CPT

## 2018-09-17 PROCEDURE — 99213 OFFICE O/P EST LOW 20 MIN: CPT | Performed by: NURSE PRACTITIONER

## 2018-09-17 RX ORDER — PREDNISONE 20 MG/1
40 TABLET ORAL DAILY
Qty: 10 TABLET | Refills: 0 | Status: SHIPPED | OUTPATIENT
Start: 2018-09-17 | End: 2018-09-22

## 2018-09-17 RX ORDER — ALBUTEROL SULFATE 90 UG/1
2 AEROSOL, METERED RESPIRATORY (INHALATION) EVERY 6 HOURS PRN
Qty: 1 INHALER | Refills: 3 | Status: SHIPPED | OUTPATIENT
Start: 2018-09-17 | End: 2022-10-11 | Stop reason: SDUPTHER

## 2018-09-17 ASSESSMENT — ENCOUNTER SYMPTOMS
WHEEZING: 1
SORE THROAT: 0
SINUS PAIN: 0
SHORTNESS OF BREATH: 1
VOMITING: 0
COUGH: 1
NAUSEA: 0
DIARRHEA: 0

## 2018-09-17 NOTE — PATIENT INSTRUCTIONS
SURVEY:    You may be receiving a survey from [employer] regarding your visit today. Please complete the survey to enable us to provide the highest quality of care to you and your family. If you cannot score us a very good on any question, please call the office to discuss how we could have made your experience a very good one. Thank you.

## 2018-09-17 NOTE — PROGRESS NOTES
HPI Notes    Name: Catrachita Kilpatrick  : 1955         Chief Complaint:     Chief Complaint   Patient presents with    URI     Patient complains of head and chest congestion, cougf. Cough is worse at night. Patient said she has been sick x 2weeks. History of Present Illness:        URI    This is a new problem. The current episode started 1 to 4 weeks ago (12 days). The problem has been waxing and waning. There has been no fever. Associated symptoms include congestion, coughing, headaches and wheezing. Pertinent negatives include no chest pain, diarrhea, nausea, sinus pain, sore throat or vomiting. Treatments tried: Levaquin. The treatment provided mild relief.        Past Medical History:     Past Medical History:   Diagnosis Date    Arthritis     Chronic kidney disease     Hydronephrosis    Depression     ON RX    Full dentures     Heart murmur     Hypertension     Scoliosis     chronic back pain    UPJ (ureteropelvic junction) obstruction 10/2014      Reviewed all health maintenance requirements and ordered appropriate tests  Health Maintenance Due   Topic Date Due    Hepatitis C screen  1955    HIV screen  01/10/1969    Pneumococcal med risk (1 of 1 - PPSV23) 01/10/1973    Cervical cancer screen  01/10/1975    Lipid screen  01/10/1994    Shingles Vaccine (1 of 2 - 2 Dose Series) 01/10/2004    Flu vaccine (1) 2018       Past Surgical History:     Past Surgical History:   Procedure Laterality Date    CARPAL TUNNEL RELEASE Bilateral      SECTION      COLONOSCOPY      Multiple     CYSTOSCOPY Right 2014    rt stent with right pyeloplasty    CYSTOURETHROSCOPY Bilateral 2014    with right stent exchange and retro pylogram    HAND SURGERY Right     crushed    LASIK Bilateral     OTHER SURGICAL HISTORY  10-16-14    Cysto; right ureteral retrograde pyelogram & stent insertion    OTHER SURGICAL HISTORY      TUBAL LIGATION REVERSAL    TUBAL LIGATION  1973    VARICOSE VEIN SURGERY Bilateral         Medications:       Prior to Admission medications    Medication Sig Start Date End Date Taking? Authorizing Provider   predniSONE (DELTASONE) 20 MG tablet Take 2 tablets by mouth daily for 5 days 9/17/18 9/22/18 Yes IVANNA Warren CNP   albuterol sulfate HFA (PROAIR HFA) 108 (90 Base) MCG/ACT inhaler Inhale 2 puffs into the lungs every 6 hours as needed for Wheezing 9/17/18  Yes IVANNA Warren CNP   fluticasone-salmeterol (ADVAIR DISKUS) 250-50 MCG/DOSE AEPB Inhale 1 puff into the lungs every 12 hours 9/17/18  Yes IVANNA Warren CNP   levofloxacin (LEVAQUIN) 500 MG tablet Take 1 tablet by mouth daily for 7 days 9/12/18 9/19/18 Yes Yuriy Zee, DO   traMADol (ULTRAM) 50 MG tablet Take 1 tablet by mouth every 6 hours as needed for Pain for up to 90 days. . 8/15/18 11/13/18 Yes Yuriy Baileyy, DO   amLODIPine (NORVASC) 5 MG tablet Take 1 tablet by mouth  daily 7/27/18  Yes Yuriy Zee, DO   pantoprazole (PROTONIX) 20 MG tablet Take 1 tablet by mouth  daily 12/6/17  Yes Yojana Soto, DO   docusate sodium (COLACE) 100 MG capsule Take 1 capsule by mouth daily as needed for Constipation. 11/26/14   Sumaya Maldonado MD        Allergies:       Patient has no known allergies. Social History:     Tobacco:    reports that she has been smoking Cigarettes. She has a 20.00 pack-year smoking history. She has never used smokeless tobacco.  Alcohol:      reports that she drinks alcohol. Drug Use:  reports that she does not use drugs. Family History:     Family History   Problem Relation Age of Onset    Cancer Mother         COLON    Kidney Disease Father         ON DIALYSIS    Cancer Paternal Grandmother         SKIN       Review of Systems:         Review of Systems   Constitutional: Negative for chills and fever. HENT: Positive for congestion. Negative for sinus pain and sore throat.     Respiratory: Positive for cough, shortness of breath and wheezing. Cardiovascular: Negative for chest pain and palpitations. Gastrointestinal: Negative for diarrhea, nausea and vomiting. Neurological: Positive for headaches. Physical Exam:     Vitals:  BP (!) 152/100 (Site: Right Upper Arm, Position: Sitting, Cuff Size: Small Adult)   Pulse 91   Temp 97.4 °F (36.3 °C) (Oral)   Wt 109 lb (49.4 kg)   LMP 11/25/2003 (Approximate)   BMI 20.60 kg/m²       Physical Exam   Constitutional: She is oriented to person, place, and time. She appears well-developed and well-nourished. She appears ill. No distress. Cardiovascular: Normal rate, regular rhythm, S1 normal and S2 normal.    Pulmonary/Chest: Effort normal. No respiratory distress. She has wheezes. She has rhonchi. Exp wheezes anterior upper lobes, scattered rhonchi throughout, diminished bilat bases. Neurological: She is alert and oriented to person, place, and time. Skin: Skin is warm and dry. Nursing note and vitals reviewed. Data:     Lab Results   Component Value Date     08/02/2017    K 3.8 08/02/2017    CL 98 08/02/2017    CO2 28 08/02/2017    BUN 14 08/02/2017    CREATININE 0.56 08/02/2017    GLUCOSE 100 08/02/2017    PROT 7.4 08/02/2017    LABALBU 4.1 08/02/2017    BILITOT 0.29 08/02/2017    ALKPHOS 144 08/02/2017    AST 25 08/02/2017    ALT 8 08/02/2017     Lab Results   Component Value Date    WBC 7.4 08/02/2017    RBC 5.50 08/02/2017    HGB 17.3 08/02/2017    HCT 51.3 08/02/2017    MCV 93.4 08/02/2017    MCH 31.4 08/02/2017    MCHC 33.6 08/02/2017    RDW 14.1 08/02/2017     08/02/2017    MPV NOT REPORTED 08/02/2017     Lab Results   Component Value Date    TSH 2.29 08/24/2015     No results found for: CHOL, HDL, PSA, LABA1C       Assessment & Plan        Diagnosis Orders   1. COPD with acute exacerbation (HCC)  fluticasone-salmeterol (ADVAIR DISKUS) 250-50 MCG/DOSE AEPB   2.  Bronchitis  XR CHEST STANDARD (2 VW) Will send for cxr. Will start pt on prednisone and albuterol inhaler. Pt educated about proper use of inhaler. Pt has underlying COPD and was instructed to start taking advair, however pt never did. Will give pt new advair as the old is probably . Pt educated about difference between maintenance and rescue inhalers. Patient verbalizes understanding and agreement with plan. All questions answered. If symptoms do not resolve or worsen, return to office. Completed Refills   Requested Prescriptions     Signed Prescriptions Disp Refills    predniSONE (DELTASONE) 20 MG tablet 10 tablet 0     Sig: Take 2 tablets by mouth daily for 5 days    albuterol sulfate HFA (PROAIR HFA) 108 (90 Base) MCG/ACT inhaler 1 Inhaler 3     Sig: Inhale 2 puffs into the lungs every 6 hours as needed for Wheezing    fluticasone-salmeterol (ADVAIR DISKUS) 250-50 MCG/DOSE AEPB 60 each 3     Sig: Inhale 1 puff into the lungs every 12 hours     Return if symptoms worsen or fail to improve. Orders Placed This Encounter   Medications    predniSONE (DELTASONE) 20 MG tablet     Sig: Take 2 tablets by mouth daily for 5 days     Dispense:  10 tablet     Refill:  0    albuterol sulfate HFA (PROAIR HFA) 108 (90 Base) MCG/ACT inhaler     Sig: Inhale 2 puffs into the lungs every 6 hours as needed for Wheezing     Dispense:  1 Inhaler     Refill:  3    fluticasone-salmeterol (ADVAIR DISKUS) 250-50 MCG/DOSE AEPB     Sig: Inhale 1 puff into the lungs every 12 hours     Dispense:  60 each     Refill:  3     Orders Placed This Encounter   Procedures    XR CHEST STANDARD (2 VW)     Standing Status:   Future     Number of Occurrences:   1     Standing Expiration Date:   2019     Order Specific Question:   Reason for exam:     Answer:   SOB, cough x 12 days         Patient Instructions     SURVEY:    You may be receiving a survey from [employer] regarding your visit today.     Please complete the survey to enable us to

## 2018-10-16 DIAGNOSIS — G89.29 CHRONIC BILATERAL LOW BACK PAIN WITHOUT SCIATICA: ICD-10-CM

## 2018-10-16 DIAGNOSIS — M54.50 CHRONIC BILATERAL LOW BACK PAIN WITHOUT SCIATICA: ICD-10-CM

## 2018-10-16 RX ORDER — TRAMADOL HYDROCHLORIDE 50 MG/1
50 TABLET ORAL EVERY 6 HOURS PRN
Qty: 120 TABLET | Refills: 0 | Status: SHIPPED | OUTPATIENT
Start: 2018-10-16 | End: 2018-10-29 | Stop reason: SDUPTHER

## 2018-10-29 ENCOUNTER — TELEPHONE (OUTPATIENT)
Dept: FAMILY MEDICINE CLINIC | Age: 63
End: 2018-10-29

## 2018-10-29 DIAGNOSIS — M54.50 CHRONIC BILATERAL LOW BACK PAIN WITHOUT SCIATICA: ICD-10-CM

## 2018-10-29 DIAGNOSIS — G89.29 CHRONIC BILATERAL LOW BACK PAIN WITHOUT SCIATICA: ICD-10-CM

## 2018-10-29 RX ORDER — TRAMADOL HYDROCHLORIDE 50 MG/1
50 TABLET ORAL EVERY 6 HOURS PRN
Qty: 120 TABLET | Refills: 0 | Status: SHIPPED | OUTPATIENT
Start: 2018-10-29 | End: 2018-11-15 | Stop reason: SDUPTHER

## 2018-10-29 NOTE — TELEPHONE ENCOUNTER
Patient's Tramadol was sent to wrong pharmacy. Phone call from 10/16/18 is asking for a refill to be sent to [facility] and the RX was sent to [facility] on 10/16/18. Can  The request be sent to [facility]?     Health Maintenance   Topic Date Due    Hepatitis C screen  1955    HIV screen  01/10/1969    Pneumococcal med risk (1 of 1 - PPSV23) 01/10/1973    Cervical cancer screen  01/10/1975    Lipid screen  01/10/1994    Shingles Vaccine (1 of 2 - 2 Dose Series) 01/10/2004    Flu vaccine (1) 09/01/2018    Breast cancer screen  08/02/2019    DTaP/Tdap/Td vaccine (2 - Td) 04/10/2027    Colon cancer screen colonoscopy  05/22/2027             (applicable per patient's age: Cancer Screenings, Depression Screening, Fall Risk Screening, Immunizations)    AST (U/L)   Date Value   08/02/2017 25     ALT (U/L)   Date Value   08/02/2017 8     BUN (mg/dL)   Date Value   08/02/2017 14      (goal A1C is < 7)   (goal LDL is <100) need 30-50% reduction from baseline     BP Readings from Last 3 Encounters:   09/17/18 (!) 152/100   08/15/18 120/70   02/13/18 124/70    (goal /80)      All Future Testing planned in [facility]:  Lab Frequency Next Occurrence       Next Visit Date:  Future Appointments  Date Time Provider Robert Francis   11/15/2018 11:20 AM DO Pal Pizarro [facility]            Patient Active Problem List:     Hydronephrosis, right     Dysthymia     Arthritis     Amaurosis fugax     History of laser assisted in situ keratomileusis

## 2018-11-15 ENCOUNTER — OFFICE VISIT (OUTPATIENT)
Dept: FAMILY MEDICINE CLINIC | Age: 63
End: 2018-11-15
Payer: COMMERCIAL

## 2018-11-15 ENCOUNTER — TELEPHONE (OUTPATIENT)
Dept: FAMILY MEDICINE CLINIC | Age: 63
End: 2018-11-15

## 2018-11-15 VITALS
WEIGHT: 114 LBS | BODY MASS INDEX: 21.52 KG/M2 | DIASTOLIC BLOOD PRESSURE: 74 MMHG | HEIGHT: 61 IN | SYSTOLIC BLOOD PRESSURE: 134 MMHG

## 2018-11-15 DIAGNOSIS — J43.1 PANLOBULAR EMPHYSEMA (HCC): ICD-10-CM

## 2018-11-15 DIAGNOSIS — G89.29 CHRONIC BILATERAL LOW BACK PAIN WITHOUT SCIATICA: Primary | ICD-10-CM

## 2018-11-15 DIAGNOSIS — R31.9 PAINLESS HEMATURIA: ICD-10-CM

## 2018-11-15 DIAGNOSIS — M54.50 CHRONIC BILATERAL LOW BACK PAIN WITHOUT SCIATICA: Primary | ICD-10-CM

## 2018-11-15 PROCEDURE — 99214 OFFICE O/P EST MOD 30 MIN: CPT | Performed by: FAMILY MEDICINE

## 2018-11-15 RX ORDER — TRAMADOL HYDROCHLORIDE 50 MG/1
50 TABLET ORAL EVERY 6 HOURS PRN
Qty: 120 TABLET | Refills: 2 | Status: SHIPPED | OUTPATIENT
Start: 2018-11-15 | End: 2019-05-01 | Stop reason: SDUPTHER

## 2018-11-15 NOTE — LETTER
[address]  [facility] [address]  Phone: [phone]  Fax: [phone]    Riya Nance DO        November 16, 2018     Patient: Samir Beckford   YOB: 1955   Date of Visit: 11/15/2018       To Whom It May Concern: It is my medical opinion that Jack Ulloa requires a disability parking placard for the following reasons:  She has limited walking ability due to an orthopedic condition. Duration of need: 5 years    If you have any questions or concerns, please don't hesitate to call.     Sincerely,        Riya Nance DO

## 2018-11-15 NOTE — PROGRESS NOTES
Name: Drake Santoro  : 1955         Chief Complaint:     Chief Complaint   Patient presents with    COPD    Back Pain       History of Present Illness:      Drake Santoro is a 58 y.o.  female who presents with COPD and Back Pain      HPI     F/u chronic back pain. Continues to be very bothersome in mid-low back, limits her activity. She decided not to go to pain mgt since she'll be retiring and changing insurance in January, didn't want to get started somewhere and then have trouble. Continues tramadol prn which does help. No adverse effects to med. Past couple mos has had blood in the urine just in the morning. No pain with urination, with or without the blood. H/o kidney stones but none recently, no flank pain. Plans to make appt with Dr Gali Barrios for eval.    COPD stable. Still smoking. Remains on advair BID and albuterol prn.     protonix just here and there. Past Medical History:     Past Medical History:   Diagnosis Date    Arthritis     Chronic kidney disease     Hydronephrosis    Depression     ON RX    Full dentures     Heart murmur     Hypertension     Scoliosis     chronic back pain    UPJ (ureteropelvic junction) obstruction 10/2014        Past Surgical History:     Past Surgical History:   Procedure Laterality Date    CARPAL TUNNEL RELEASE Bilateral      SECTION      COLONOSCOPY      Multiple     CYSTOSCOPY Right 2014    rt stent with right pyeloplasty    CYSTOURETHROSCOPY Bilateral 2014    with right stent exchange and retro pylogram    HAND SURGERY Right     crushed    LASIK Bilateral     OTHER SURGICAL HISTORY  10-16-14    Cysto; right ureteral retrograde pyelogram & stent insertion    OTHER SURGICAL HISTORY      TUBAL LIGATION REVERSAL    TUBAL LIGATION  1973    VARICOSE VEIN SURGERY Bilateral         Medications:       Prior to Admission medications    Medication Sig Start Date End Date Taking?  Authorizing Provider Neurological: She is alert and oriented to person, place, and time. Skin: Skin is warm and dry. Psychiatric: She has a normal mood and affect. Judgment normal. Her speech is tangential.   Nursing note and vitals reviewed. Data:     Lab Results   Component Value Date     08/02/2017    K 3.8 08/02/2017    CL 98 08/02/2017    CO2 28 08/02/2017    BUN 14 08/02/2017    CREATININE 0.56 08/02/2017    GLUCOSE 100 08/02/2017    PROT 7.4 08/02/2017    LABALBU 4.1 08/02/2017    BILITOT 0.29 08/02/2017    ALKPHOS 144 08/02/2017    AST 25 08/02/2017    ALT 8 08/02/2017     Lab Results   Component Value Date    WBC 7.4 08/02/2017    RBC 5.50 08/02/2017    HGB 17.3 08/02/2017    HCT 51.3 08/02/2017    MCV 93.4 08/02/2017    MCH 31.4 08/02/2017    MCHC 33.6 08/02/2017    RDW 14.1 08/02/2017     08/02/2017    MPV NOT REPORTED 08/02/2017     Lab Results   Component Value Date    TSH 2.29 08/24/2015     No results found for: CHOL, HDL, PSA, LABA1C      Assessment & Plan:        Diagnosis Orders   1. Chronic bilateral low back pain without sciatica  traMADol (ULTRAM) 50 MG tablet   2. Panlobular emphysema (HCC)     3. Painless hematuria     continued chronic back pain. Pt failed to go to pain mgt as I had ordered. Reasonable to wait til she's on her new insurance. F/u 2-3 mos to discuss. Ok to cont tramadol prn for now. Emphysema stable. Advised smoking cessation. Hematuria only in the morning. Chronic smoker. Walked pt over to specialty office after appt and later confirmed that she did make an appt for 11/29. Advised pt specifically that she may have bladder cancer, and she was already aware of the possibility and expressed understanding. Offered UA now but pt declined as she had already urinated today and believes blood is only in the morning urine.          Requested Prescriptions     Signed Prescriptions Disp Refills    traMADol (ULTRAM) 50 MG tablet 120 tablet 2     Sig: Take 1 tablet by mouth every 6 hours as needed for Pain for up to 90 days. .       There are no Patient Instructions on file for this visit. Steve Howard received counseling on the following healthy behaviors: medication adherence  Reviewed prior labs and health maintenance. Continue current medications, diet and exercise. Discussed use, benefit, and side effects of prescribed medications. Barriers to medication compliance addressed. Patient given educational materials - see patient instructions. All patient questions answered. Patient voiced understanding.      Electronically signed by Karina Turner DO on 11/17/2018 at 11:29 PM   [address] [place] [id number]  Dept: [phone]

## 2018-11-17 ASSESSMENT — ENCOUNTER SYMPTOMS
GASTROINTESTINAL NEGATIVE: 1
RESPIRATORY NEGATIVE: 1

## 2018-12-06 ENCOUNTER — HOSPITAL ENCOUNTER (OUTPATIENT)
Age: 63
Discharge: HOME | End: 2018-12-06
Payer: COMMERCIAL

## 2018-12-06 ENCOUNTER — OFFICE VISIT (OUTPATIENT)
Dept: UROLOGY | Age: 63
End: 2018-12-06
Payer: COMMERCIAL

## 2018-12-06 VITALS
HEIGHT: 61 IN | DIASTOLIC BLOOD PRESSURE: 80 MMHG | WEIGHT: 116 LBS | SYSTOLIC BLOOD PRESSURE: 138 MMHG | BODY MASS INDEX: 21.9 KG/M2

## 2018-12-06 DIAGNOSIS — R31.0 GROSS HEMATURIA: Primary | ICD-10-CM

## 2018-12-06 DIAGNOSIS — R31.0 GROSS HEMATURIA: ICD-10-CM

## 2018-12-06 LAB
-: ABNORMAL
AMORPHOUS: ABNORMAL
ANION GAP SERPL CALCULATED.3IONS-SCNC: 11 MMOL/L (ref 9–17)
BACTERIA: ABNORMAL
BILIRUBIN URINE: NEGATIVE
BUN BLDV-MCNC: 20 MG/DL (ref 8–23)
BUN/CREAT BLD: 40 (ref 9–20)
CALCIUM SERPL-MCNC: 9.7 MG/DL (ref 8.6–10.4)
CASTS UA: ABNORMAL /LPF
CHLORIDE BLD-SCNC: 102 MMOL/L (ref 98–107)
CO2: 28 MMOL/L (ref 20–31)
COLOR: YELLOW
COMMENT UA: ABNORMAL
CREAT SERPL-MCNC: 0.5 MG/DL (ref 0.5–0.9)
CRYSTALS, UA: ABNORMAL /HPF
EPITHELIAL CELLS UA: ABNORMAL /HPF
GFR AFRICAN AMERICAN: >60 ML/MIN
GFR NON-AFRICAN AMERICAN: >60 ML/MIN
GFR SERPL CREATININE-BSD FRML MDRD: ABNORMAL ML/MIN/{1.73_M2}
GFR SERPL CREATININE-BSD FRML MDRD: ABNORMAL ML/MIN/{1.73_M2}
GLUCOSE BLD-MCNC: 117 MG/DL (ref 70–99)
GLUCOSE URINE: NEGATIVE
KETONES, URINE: NEGATIVE
LEUKOCYTE ESTERASE, URINE: NEGATIVE
MUCUS: ABNORMAL
NITRITE, URINE: NEGATIVE
OTHER OBSERVATIONS UA: ABNORMAL
PH UA: 5 (ref 5–8)
POTASSIUM SERPL-SCNC: 4 MMOL/L (ref 3.7–5.3)
PROTEIN UA: NEGATIVE
RBC UA: ABNORMAL /HPF (ref 0–2)
RENAL EPITHELIAL, UA: ABNORMAL /HPF
SODIUM BLD-SCNC: 141 MMOL/L (ref 135–144)
SPECIFIC GRAVITY UA: 1.02 (ref 1–1.03)
TRICHOMONAS: ABNORMAL
TURBIDITY: CLEAR
URINE HGB: ABNORMAL
UROBILINOGEN, URINE: NORMAL
WBC UA: ABNORMAL /HPF
YEAST: ABNORMAL

## 2018-12-06 PROCEDURE — 87086 URINE CULTURE/COLONY COUNT: CPT

## 2018-12-06 PROCEDURE — 99214 OFFICE O/P EST MOD 30 MIN: CPT | Performed by: NURSE PRACTITIONER

## 2018-12-06 PROCEDURE — 81001 URINALYSIS AUTO W/SCOPE: CPT

## 2018-12-06 PROCEDURE — 36415 COLL VENOUS BLD VENIPUNCTURE: CPT

## 2018-12-06 PROCEDURE — 80048 BASIC METABOLIC PNL TOTAL CA: CPT

## 2018-12-06 ASSESSMENT — ENCOUNTER SYMPTOMS
NAUSEA: 0
COUGH: 0
COLOR CHANGE: 0
CONSTIPATION: 0
EYE PAIN: 0
WHEEZING: 0
SHORTNESS OF BREATH: 0
BACK PAIN: 0
ABDOMINAL PAIN: 0
VOMITING: 0
EYE REDNESS: 0

## 2018-12-07 LAB
CULTURE: NO GROWTH
Lab: NORMAL
SPECIMEN DESCRIPTION: NORMAL
STATUS: NORMAL

## 2018-12-10 ENCOUNTER — TELEPHONE (OUTPATIENT)
Dept: UROLOGY | Age: 63
End: 2018-12-10

## 2018-12-10 NOTE — TELEPHONE ENCOUNTER
----- Message from IVANNA Clay - CNP sent at 12/10/2018  8:52 AM EST -----  Call pt - urine cx reviewed and negative for UTI & for significant microhematuria

## 2018-12-13 ENCOUNTER — PROCEDURE VISIT (OUTPATIENT)
Dept: UROLOGY | Age: 63
End: 2018-12-13
Payer: COMMERCIAL

## 2018-12-13 ENCOUNTER — HOSPITAL ENCOUNTER (OUTPATIENT)
Dept: CT IMAGING | Age: 63
Discharge: HOME | End: 2018-12-15
Payer: COMMERCIAL

## 2018-12-13 VITALS
DIASTOLIC BLOOD PRESSURE: 80 MMHG | SYSTOLIC BLOOD PRESSURE: 122 MMHG | WEIGHT: 115 LBS | HEIGHT: 60 IN | BODY MASS INDEX: 22.58 KG/M2

## 2018-12-13 DIAGNOSIS — R31.0 GROSS HEMATURIA: Primary | ICD-10-CM

## 2018-12-13 DIAGNOSIS — R31.0 GROSS HEMATURIA: ICD-10-CM

## 2018-12-13 PROCEDURE — 99213 OFFICE O/P EST LOW 20 MIN: CPT | Performed by: UROLOGY

## 2018-12-13 PROCEDURE — 6360000004 HC RX CONTRAST MEDICATION: Performed by: NURSE PRACTITIONER

## 2018-12-13 PROCEDURE — 52000 CYSTOURETHROSCOPY: CPT | Performed by: UROLOGY

## 2018-12-13 PROCEDURE — 74178 CT ABD&PLV WO CNTR FLWD CNTR: CPT

## 2018-12-13 RX ORDER — CONJUGATED ESTROGENS 0.62 MG/1
TABLET, FILM COATED ORAL
COMMUNITY
Start: 2018-12-07 | End: 2018-12-13 | Stop reason: HOSPADM

## 2018-12-13 RX ADMIN — IOPAMIDOL 125 ML: 755 INJECTION, SOLUTION INTRAVENOUS at 11:49

## 2018-12-13 ASSESSMENT — ENCOUNTER SYMPTOMS
ABDOMINAL PAIN: 0
VOMITING: 0
EYE PAIN: 0
WHEEZING: 0
BACK PAIN: 0
SHORTNESS OF BREATH: 0
EYE REDNESS: 0
COLOR CHANGE: 0
COUGH: 0
NAUSEA: 0

## 2018-12-13 NOTE — PROGRESS NOTES
ON RX    Full dentures     Heart murmur     Hypertension     Scoliosis     chronic back pain    UPJ (ureteropelvic junction) obstruction 10/2014     Past Surgical History:   Procedure Laterality Date    CARPAL TUNNEL RELEASE Bilateral      SECTION      COLONOSCOPY      Multiple     CYSTOSCOPY Right 2014    rt stent with right pyeloplasty    CYSTOURETHROSCOPY Bilateral 2014    with right stent exchange and retro pylogram    HAND SURGERY Right     crushed    LASIK Bilateral     OTHER SURGICAL HISTORY  10-16-14    Cysto; right ureteral retrograde pyelogram & stent insertion    OTHER SURGICAL HISTORY      TUBAL LIGATION REVERSAL    TUBAL LIGATION  1973    VARICOSE VEIN SURGERY Bilateral      Family History   Problem Relation Age of Onset    Cancer Mother         COLON    Kidney Disease Father         ON DIALYSIS    Cancer Paternal Grandmother         SKIN     Current Outpatient Prescriptions on File Prior to Visit   Medication Sig Dispense Refill    traMADol (ULTRAM) 50 MG tablet Take 1 tablet by mouth every 6 hours as needed for Pain for up to 90 days. . 120 tablet 2    amLODIPine (NORVASC) 5 MG tablet Take 1 tablet by mouth  daily 90 tablet 1    albuterol sulfate HFA (PROAIR HFA) 108 (90 Base) MCG/ACT inhaler Inhale 2 puffs into the lungs every 6 hours as needed for Wheezing 1 Inhaler 3    fluticasone-salmeterol (ADVAIR DISKUS) 250-50 MCG/DOSE AEPB Inhale 1 puff into the lungs every 12 hours 60 each 3    pantoprazole (PROTONIX) 20 MG tablet Take 1 tablet by mouth  daily 90 tablet 2    docusate sodium (COLACE) 100 MG capsule Take 1 capsule by mouth daily as needed for Constipation. 14 capsule 0     No current facility-administered medications on file prior to visit.        Outpatient Encounter Prescriptions as of 2018   Medication Sig Dispense Refill    traMADol (ULTRAM) 50 MG tablet Take 1 tablet by mouth every 6 hours as needed for Pain for up to 90 days. . 120 tablet 2    amLODIPine (NORVASC) 5 MG tablet Take 1 tablet by mouth  daily 90 tablet 1    [DISCONTINUED] PREMARIN 0.625 MG tablet       albuterol sulfate HFA (PROAIR HFA) 108 (90 Base) MCG/ACT inhaler Inhale 2 puffs into the lungs every 6 hours as needed for Wheezing 1 Inhaler 3    fluticasone-salmeterol (ADVAIR DISKUS) 250-50 MCG/DOSE AEPB Inhale 1 puff into the lungs every 12 hours 60 each 3    pantoprazole (PROTONIX) 20 MG tablet Take 1 tablet by mouth  daily 90 tablet 2    docusate sodium (COLACE) 100 MG capsule Take 1 capsule by mouth daily as needed for Constipation. 14 capsule 0     No facility-administered encounter medications on file as of 12/13/2018. Patient has no known allergies. History   Smoking Status    Current Every Day Smoker    Packs/day: 1.00    Years: 20.00    Types: Cigarettes   Smokeless Tobacco    Never Used     History   Alcohol Use    Yes     Comment: WINE ONCE A MONTH       Vitals:    12/13/18 1333   BP: 122/80     PHYSICAL EXAM:  Constitutional: Patient resting comfortably, in no acute distress. Neuro: Alert and oriented to person place and time. Cranial nerves grossly intact. Psych: Mood and affect normal.  Skin: Warm, dry  HEENT: normocephalic, atraumatic  Lymphatics: No palpable lymphadenopathy  Lungs: Respiratory effort normal, unlabored  Cardiovascular:  Normal peripheral pulses  Abdomen: Soft, non-tender, non-distended with no organomegaly or palpable masses. : No CVA tenderness. Bladder non-tender and not distended. Pelvic: vaginal atrophy      No results found for this visit on 12/13/18. Lab Results   Component Value Date    BUN 20 12/06/2018     Lab Results   Component Value Date    CREATININE 0.50 12/06/2018     Review of Systems   Constitutional: Negative for appetite change, chills and fever. Eyes: Negative for pain, redness and visual disturbance. Respiratory: Negative for cough, shortness of breath and wheezing.

## 2018-12-27 RX ORDER — AMLODIPINE BESYLATE 5 MG/1
TABLET ORAL
Qty: 90 TABLET | Refills: 1 | Status: SHIPPED | OUTPATIENT
Start: 2018-12-27 | End: 2019-05-01 | Stop reason: SDUPTHER

## 2019-05-01 DIAGNOSIS — M54.50 CHRONIC BILATERAL LOW BACK PAIN WITHOUT SCIATICA: ICD-10-CM

## 2019-05-01 DIAGNOSIS — G89.29 CHRONIC BILATERAL LOW BACK PAIN WITHOUT SCIATICA: ICD-10-CM

## 2019-05-01 RX ORDER — AMLODIPINE BESYLATE 5 MG/1
TABLET ORAL
Qty: 90 TABLET | Refills: 0 | Status: SHIPPED | OUTPATIENT
Start: 2019-05-01 | End: 2019-07-28 | Stop reason: SDUPTHER

## 2019-05-01 RX ORDER — TRAMADOL HYDROCHLORIDE 50 MG/1
50 TABLET ORAL EVERY 6 HOURS PRN
Qty: 120 TABLET | Refills: 0 | Status: SHIPPED | OUTPATIENT
Start: 2019-05-01 | End: 2019-06-11 | Stop reason: SDUPTHER

## 2019-05-01 NOTE — TELEPHONE ENCOUNTER
Pt calling in asking for a refill of ultram and amlodipine 90 day rx with 0 refills   Patient is in [facility] and will be home middle of June and she will call to schedule an appointment   meds pending if you approve

## 2019-06-11 ENCOUNTER — HOSPITAL ENCOUNTER (OUTPATIENT)
Dept: GENERAL RADIOLOGY | Age: 64
Discharge: HOME | End: 2019-06-13
Payer: MEDICARE

## 2019-06-11 ENCOUNTER — HOSPITAL ENCOUNTER (OUTPATIENT)
Age: 64
Discharge: HOME | End: 2019-06-11
Payer: MEDICARE

## 2019-06-11 ENCOUNTER — OFFICE VISIT (OUTPATIENT)
Dept: FAMILY MEDICINE CLINIC | Age: 64
End: 2019-06-11
Payer: MEDICARE

## 2019-06-11 ENCOUNTER — HOSPITAL ENCOUNTER (OUTPATIENT)
Age: 64
Discharge: HOME | End: 2019-06-13
Payer: MEDICARE

## 2019-06-11 VITALS
BODY MASS INDEX: 23.44 KG/M2 | DIASTOLIC BLOOD PRESSURE: 66 MMHG | OXYGEN SATURATION: 95 % | WEIGHT: 120 LBS | SYSTOLIC BLOOD PRESSURE: 100 MMHG | HEART RATE: 66 BPM

## 2019-06-11 DIAGNOSIS — G89.29 CHRONIC BILATERAL LOW BACK PAIN WITHOUT SCIATICA: ICD-10-CM

## 2019-06-11 DIAGNOSIS — M79.89 PAIN AND SWELLING OF RIGHT LOWER LEG: ICD-10-CM

## 2019-06-11 DIAGNOSIS — M79.661 PAIN AND SWELLING OF RIGHT LOWER LEG: Primary | ICD-10-CM

## 2019-06-11 DIAGNOSIS — M79.661 PAIN AND SWELLING OF RIGHT LOWER LEG: ICD-10-CM

## 2019-06-11 DIAGNOSIS — M54.50 CHRONIC BILATERAL LOW BACK PAIN WITHOUT SCIATICA: ICD-10-CM

## 2019-06-11 DIAGNOSIS — M79.89 PAIN AND SWELLING OF RIGHT LOWER LEG: Primary | ICD-10-CM

## 2019-06-11 LAB
ABSOLUTE EOS #: 0.2 K/UL (ref 0–0.4)
ABSOLUTE IMMATURE GRANULOCYTE: NORMAL K/UL (ref 0–0.3)
ABSOLUTE LYMPH #: 2.7 K/UL (ref 1–4.8)
ABSOLUTE MONO #: 0.5 K/UL (ref 0–1)
ANION GAP SERPL CALCULATED.3IONS-SCNC: 13 MMOL/L (ref 9–17)
BASOPHILS # BLD: 1 % (ref 0–2)
BASOPHILS ABSOLUTE: 0 K/UL (ref 0–0.2)
BUN BLDV-MCNC: 21 MG/DL (ref 8–23)
BUN/CREAT BLD: 27 (ref 9–20)
CALCIUM SERPL-MCNC: 10.2 MG/DL (ref 8.6–10.4)
CHLORIDE BLD-SCNC: 102 MMOL/L (ref 98–107)
CO2: 26 MMOL/L (ref 20–31)
CREAT SERPL-MCNC: 0.77 MG/DL (ref 0.5–0.9)
D-DIMER QUANTITATIVE: 2.13 MG/L FEU (ref 0–0.5)
DIFFERENTIAL TYPE: YES
EOSINOPHILS RELATIVE PERCENT: 3 % (ref 0–5)
GFR AFRICAN AMERICAN: >60 ML/MIN
GFR NON-AFRICAN AMERICAN: >60 ML/MIN
GFR SERPL CREATININE-BSD FRML MDRD: ABNORMAL ML/MIN/{1.73_M2}
GFR SERPL CREATININE-BSD FRML MDRD: ABNORMAL ML/MIN/{1.73_M2}
GLUCOSE BLD-MCNC: 94 MG/DL (ref 70–99)
HCT VFR BLD CALC: 44.6 % (ref 36–46)
HEMOGLOBIN: 15.3 G/DL (ref 12–16)
IMMATURE GRANULOCYTES: NORMAL %
LYMPHOCYTES # BLD: 37 % (ref 15–40)
MCH RBC QN AUTO: 32 PG (ref 26–34)
MCHC RBC AUTO-ENTMCNC: 34.3 G/DL (ref 31–37)
MCV RBC AUTO: 93.2 FL (ref 80–100)
MONOCYTES # BLD: 7 % (ref 4–8)
NRBC AUTOMATED: NORMAL PER 100 WBC
PDW BLD-RTO: 14.1 % (ref 12.1–15.2)
PLATELET # BLD: 291 K/UL (ref 140–450)
PLATELET ESTIMATE: NORMAL
PMV BLD AUTO: NORMAL FL (ref 6–12)
POTASSIUM SERPL-SCNC: 4.3 MMOL/L (ref 3.7–5.3)
RBC # BLD: 4.78 M/UL (ref 4–5.2)
RBC # BLD: NORMAL 10*6/UL
SEDIMENTATION RATE, ERYTHROCYTE: 13 MM (ref 0–30)
SEG NEUTROPHILS: 52 % (ref 47–75)
SEGMENTED NEUTROPHILS ABSOLUTE COUNT: 3.9 K/UL (ref 2.5–7)
SODIUM BLD-SCNC: 141 MMOL/L (ref 135–144)
WBC # BLD: 7.3 K/UL (ref 3.5–11)
WBC # BLD: NORMAL 10*3/UL

## 2019-06-11 PROCEDURE — 73610 X-RAY EXAM OF ANKLE: CPT

## 2019-06-11 PROCEDURE — 1090F PRES/ABSN URINE INCON ASSESS: CPT | Performed by: FAMILY MEDICINE

## 2019-06-11 PROCEDURE — G8427 DOCREV CUR MEDS BY ELIG CLIN: HCPCS | Performed by: FAMILY MEDICINE

## 2019-06-11 PROCEDURE — 85025 COMPLETE CBC W/AUTO DIFF WBC: CPT

## 2019-06-11 PROCEDURE — 85379 FIBRIN DEGRADATION QUANT: CPT

## 2019-06-11 PROCEDURE — 80048 BASIC METABOLIC PNL TOTAL CA: CPT

## 2019-06-11 PROCEDURE — 99214 OFFICE O/P EST MOD 30 MIN: CPT | Performed by: FAMILY MEDICINE

## 2019-06-11 PROCEDURE — 36415 COLL VENOUS BLD VENIPUNCTURE: CPT

## 2019-06-11 PROCEDURE — 3017F COLORECTAL CA SCREEN DOC REV: CPT | Performed by: FAMILY MEDICINE

## 2019-06-11 PROCEDURE — 4004F PT TOBACCO SCREEN RCVD TLK: CPT | Performed by: FAMILY MEDICINE

## 2019-06-11 PROCEDURE — G8420 CALC BMI NORM PARAMETERS: HCPCS | Performed by: FAMILY MEDICINE

## 2019-06-11 PROCEDURE — 4040F PNEUMOC VAC/ADMIN/RCVD: CPT | Performed by: FAMILY MEDICINE

## 2019-06-11 PROCEDURE — G8400 PT W/DXA NO RESULTS DOC: HCPCS | Performed by: FAMILY MEDICINE

## 2019-06-11 PROCEDURE — 85651 RBC SED RATE NONAUTOMATED: CPT

## 2019-06-11 PROCEDURE — 1123F ACP DISCUSS/DSCN MKR DOCD: CPT | Performed by: FAMILY MEDICINE

## 2019-06-11 RX ORDER — TRAMADOL HYDROCHLORIDE 50 MG/1
50 TABLET ORAL EVERY 6 HOURS PRN
Qty: 120 TABLET | Refills: 0 | Status: SHIPPED | OUTPATIENT
Start: 2019-06-11 | End: 2019-09-09

## 2019-06-11 ASSESSMENT — ENCOUNTER SYMPTOMS: RESPIRATORY NEGATIVE: 1

## 2019-06-11 NOTE — PROGRESS NOTES
Name: Antwan King  : 1955         Chief Complaint:     Chief Complaint   Patient presents with    Hypertension     6 month check, patient sprained her ankle last week and she has a mole she wants removed between shoulder blades. History of Present Illness:      Antwan King is a 71 y.o.  female who presents with Hypertension (6 month check, patient sprained her ankle last week and she has a mole she wants removed between shoulder blades. )      HPI    C/o R ankle pain and swelling. Onset after she had tripped over a tree root about a week and a half ago while picking up garbage off the ground. She was in [place] at the time. Swelling came on fairly quickly after the injury. She also had some breaks in the skin and at one point had pus coming out of those areas, so she put skin adhesive over them. She is still able to ambulate, but has lateral ankle pain with ambulation. Patient and  drove back from [place] within the last few days. Feeling well otherwise. Complains of skin lesion on the center of her upper back, gets caught on clothing and when she brushes her hair. Unsure whether it has changed over time. Present for a long time but becoming more bothersome and would like to have it removed. Medical History:     Patient Active Problem List   Diagnosis    Hydronephrosis, right    Dysthymia    Arthritis    Amaurosis fugax    History of laser assisted in situ keratomileusis       Medications:       Prior to Admission medications    Medication Sig Start Date End Date Taking? Authorizing Provider   traMADol (ULTRAM) 50 MG tablet Take 1 tablet by mouth every 6 hours as needed for Pain for up to 90 days.  19 Yes Monica Llanos, DO   amLODIPine (NORVASC) 5 MG tablet Take 1 tablet by mouth  daily 19  Yes Monica Llanos, DO   albuterol sulfate HFA (PROAIR HFA) 108 (90 Base) MCG/ACT inhaler Inhale 2 puffs into the lungs every 6 hours as needed for Wheezing 06/11/2019    GLUCOSE 94 06/11/2019    PROT 7.4 08/02/2017    LABALBU 4.1 08/02/2017    BILITOT 0.29 08/02/2017    ALKPHOS 144 08/02/2017    AST 25 08/02/2017    ALT 8 08/02/2017     Lab Results   Component Value Date    WBC 7.3 06/11/2019    RBC 4.78 06/11/2019    HGB 15.3 06/11/2019    HCT 44.6 06/11/2019    MCV 93.2 06/11/2019    MCH 32.0 06/11/2019    MCHC 34.3 06/11/2019    RDW 14.1 06/11/2019     06/11/2019    MPV NOT REPORTED 06/11/2019     Lab Results   Component Value Date    TSH 2.29 08/24/2015     No results found for: CHOL, HDL, PSA, LABA1C      Assessment & Plan:        Diagnosis Orders   1. Pain and swelling of right lower leg  Sedimentation Rate    CBC Auto Differential    Basic Metabolic Panel    D-Dimer, Quantitative    XR ANKLE RIGHT (MIN 3 VIEWS)   2. Chronic bilateral low back pain without sciatica  traMADol (ULTRAM) 50 MG tablet   Acute onset of pain and swelling in the right lateral ankle after an injury. Swelling started shortly after the injury but has persisted and gotten worse. She is still ambulating on the foot. She also has very mild erythema of the skin and apparently had purulent drainage out of some open areas. Differential diagnosis includes fracture, DVT, cellulitis. Labs and x-ray ordered as above. Refilled tramadol      Controlled Substance Monitoring:    Acute and Chronic Pain Monitoring:   RX Monitoring 6/11/2019   Attestation -   Periodic Controlled Substance Monitoring No signs of potential drug abuse or diversion identified. Requested Prescriptions     Signed Prescriptions Disp Refills    traMADol (ULTRAM) 50 MG tablet 120 tablet 0     Sig: Take 1 tablet by mouth every 6 hours as needed for Pain for up to 90 days. Patient Instructions     SURVEY:    You may be receiving a survey from [employer] regarding your visit today. Please complete the survey to enable us to provide the highest quality of care to you and your family.     If you cannot score us a very good on any question, please call the office to discuss how we could of made your experience a very good one. Thank you. Vitaly Pretty received counseling on the following healthy behaviors: medication adherence  Reviewed prior labs and health maintenance. Continue current medications, diet and exercise. Discussed use, benefit, and side effects of prescribed medications. Barriers to medication compliance addressed. Patient given educational materials - see patient instructions. All patient questions answered. Patient voiced understanding.        Electronically signed by Shobha Lilly DO on 6/11/2019 at 5:45 PM  [facility]  [place] [id number]  Dept: [phone]

## 2019-06-12 ENCOUNTER — TELEPHONE (OUTPATIENT)
Dept: FAMILY MEDICINE CLINIC | Age: 64
End: 2019-06-12

## 2019-06-12 DIAGNOSIS — M79.661 PAIN AND SWELLING OF RIGHT LOWER LEG: Primary | ICD-10-CM

## 2019-06-12 DIAGNOSIS — R79.89 ELEVATED D-DIMER: ICD-10-CM

## 2019-06-12 DIAGNOSIS — M79.89 PAIN AND SWELLING OF RIGHT LOWER LEG: Primary | ICD-10-CM

## 2019-06-12 DIAGNOSIS — S82.831A OTHER CLOSED FRACTURE OF DISTAL END OF RIGHT FIBULA, INITIAL ENCOUNTER: ICD-10-CM

## 2019-06-12 NOTE — TELEPHONE ENCOUNTER
----- Message from Calvin Correa DO sent at 6/12/2019  9:15 AM EDT -----  Blood test that can indicate blood clots was elevated. As we discussed yesterday this can be a false positive, but with the amount of swelling that she is having, I do want her to have an ultrasound done today.

## 2019-06-12 NOTE — TELEPHONE ENCOUNTER
Message left for [facility] to return my call to see about getting her in today.     Patient would like US in [place]

## 2019-06-12 NOTE — TELEPHONE ENCOUNTER
----- Message from Tommie Russell DO sent at 6/12/2019  9:14 AM EDT -----  Did fracture the fibula. Mildly displaced, so I do recommend that she see podiatry or orthopedics. Needs to be nonweightbearing until she does, so get some crutches.

## 2019-06-13 DIAGNOSIS — M79.89 PAIN AND SWELLING OF RIGHT LOWER LEG: ICD-10-CM

## 2019-06-13 DIAGNOSIS — R79.89 ELEVATED D-DIMER: ICD-10-CM

## 2019-06-13 DIAGNOSIS — M79.661 PAIN AND SWELLING OF RIGHT LOWER LEG: ICD-10-CM

## 2019-07-30 RX ORDER — AMLODIPINE BESYLATE 5 MG/1
TABLET ORAL
Qty: 90 TABLET | Refills: 1 | Status: SHIPPED | OUTPATIENT
Start: 2019-07-30 | End: 2019-11-19 | Stop reason: SDUPTHER

## 2019-09-11 DIAGNOSIS — G89.29 CHRONIC BILATERAL LOW BACK PAIN WITHOUT SCIATICA: ICD-10-CM

## 2019-09-11 DIAGNOSIS — M54.50 CHRONIC BILATERAL LOW BACK PAIN WITHOUT SCIATICA: ICD-10-CM

## 2019-09-11 RX ORDER — TRAMADOL HYDROCHLORIDE 50 MG/1
50 TABLET ORAL EVERY 6 HOURS PRN
Qty: 120 TABLET | Refills: 0 | Status: SHIPPED | OUTPATIENT
Start: 2019-09-11 | End: 2019-11-19 | Stop reason: SDUPTHER

## 2019-11-04 ENCOUNTER — TELEPHONE (OUTPATIENT)
Dept: FAMILY MEDICINE CLINIC | Age: 64
End: 2019-11-04

## 2019-11-19 DIAGNOSIS — M54.50 CHRONIC BILATERAL LOW BACK PAIN WITHOUT SCIATICA: ICD-10-CM

## 2019-11-19 DIAGNOSIS — G89.29 CHRONIC BILATERAL LOW BACK PAIN WITHOUT SCIATICA: ICD-10-CM

## 2019-11-19 RX ORDER — TRAMADOL HYDROCHLORIDE 50 MG/1
50 TABLET ORAL EVERY 6 HOURS PRN
Qty: 120 TABLET | Refills: 1 | Status: SHIPPED | OUTPATIENT
Start: 2019-11-19 | End: 2020-02-17

## 2019-11-19 RX ORDER — AMLODIPINE BESYLATE 5 MG/1
TABLET ORAL
Qty: 90 TABLET | Refills: 1 | Status: SHIPPED | OUTPATIENT
Start: 2019-11-19 | End: 2020-06-08

## 2020-03-18 RX ORDER — TRAMADOL HYDROCHLORIDE 50 MG/1
50 TABLET ORAL EVERY 6 HOURS PRN
Qty: 120 TABLET | Refills: 0 | Status: SHIPPED | OUTPATIENT
Start: 2020-03-18 | End: 2020-06-08 | Stop reason: SDUPTHER

## 2020-03-18 NOTE — TELEPHONE ENCOUNTER
Last OV 6/11/19 for HTN and pain, swelling right lower leg  Requesting refill on tramadol  She is in [place] for awhile . She is not sure how long she will be in [place]. Her pipes broke at her home in [place], so she does not have a home to come home to right now. It is being remodeled. Plus she not traveling to [address] [place] [address] .    Rx pending if OK  She said she will make an appt as soon as she gets back to [place]

## 2020-06-08 RX ORDER — AMLODIPINE BESYLATE 5 MG/1
TABLET ORAL
Qty: 90 TABLET | Refills: 0 | Status: SHIPPED | OUTPATIENT
Start: 2020-06-08 | End: 2020-10-01

## 2020-06-08 RX ORDER — TRAMADOL HYDROCHLORIDE 50 MG/1
50 TABLET ORAL EVERY 6 HOURS PRN
Qty: 120 TABLET | Refills: 0 | Status: SHIPPED | OUTPATIENT
Start: 2020-06-08 | End: 2020-10-02 | Stop reason: SDUPTHER

## 2020-06-08 NOTE — TELEPHONE ENCOUNTER
Last OV 6/11/19 for HTN and ankle swelling  Requesting refill on amlodipine thru [facility]  Attempted to call patient and tell her she needs apt

## 2020-07-20 ENCOUNTER — HOSPITAL ENCOUNTER (OUTPATIENT)
Age: 65
Discharge: HOME | End: 2020-07-20
Payer: MEDICARE

## 2020-07-20 ENCOUNTER — OFFICE VISIT (OUTPATIENT)
Dept: FAMILY MEDICINE CLINIC | Age: 65
End: 2020-07-20
Payer: MEDICARE

## 2020-07-20 VITALS
DIASTOLIC BLOOD PRESSURE: 80 MMHG | OXYGEN SATURATION: 98 % | SYSTOLIC BLOOD PRESSURE: 126 MMHG | BODY MASS INDEX: 25.32 KG/M2 | WEIGHT: 129 LBS | HEIGHT: 60 IN | HEART RATE: 69 BPM

## 2020-07-20 PROBLEM — J43.1 PANLOBULAR EMPHYSEMA (HCC): Status: ACTIVE | Noted: 2020-07-20

## 2020-07-20 LAB
ALBUMIN SERPL-MCNC: 4.4 G/DL (ref 3.5–5.2)
ALBUMIN/GLOBULIN RATIO: ABNORMAL (ref 1–2.5)
ALP BLD-CCNC: 109 U/L (ref 35–104)
ALT SERPL-CCNC: 6 U/L (ref 5–33)
ANION GAP SERPL CALCULATED.3IONS-SCNC: 11 MMOL/L (ref 9–17)
AST SERPL-CCNC: 20 U/L
BILIRUB SERPL-MCNC: 0.4 MG/DL (ref 0.3–1.2)
BUN BLDV-MCNC: 21 MG/DL (ref 8–23)
BUN/CREAT BLD: 30 (ref 9–20)
CALCIUM SERPL-MCNC: 10.1 MG/DL (ref 8.6–10.4)
CHLORIDE BLD-SCNC: 103 MMOL/L (ref 98–107)
CHOLESTEROL/HDL RATIO: 2.5
CHOLESTEROL: 183 MG/DL
CO2: 29 MMOL/L (ref 20–31)
CREAT SERPL-MCNC: 0.71 MG/DL (ref 0.5–0.9)
GFR AFRICAN AMERICAN: >60 ML/MIN
GFR NON-AFRICAN AMERICAN: >60 ML/MIN
GFR SERPL CREATININE-BSD FRML MDRD: ABNORMAL ML/MIN/{1.73_M2}
GFR SERPL CREATININE-BSD FRML MDRD: ABNORMAL ML/MIN/{1.73_M2}
GLUCOSE BLD-MCNC: 108 MG/DL (ref 70–99)
HDLC SERPL-MCNC: 72 MG/DL
HEPATITIS C ANTIBODY: NONREACTIVE
LDL CHOLESTEROL: 80 MG/DL (ref 0–130)
PATIENT FASTING?: NO
POTASSIUM SERPL-SCNC: 4.6 MMOL/L (ref 3.7–5.3)
SODIUM BLD-SCNC: 143 MMOL/L (ref 135–144)
TOTAL PROTEIN: 7.2 G/DL (ref 6.4–8.3)
TRIGL SERPL-MCNC: 156 MG/DL
TSH SERPL DL<=0.05 MIU/L-ACNC: 1.71 MIU/L (ref 0.3–5)
VLDLC SERPL CALC-MCNC: ABNORMAL MG/DL (ref 1–30)

## 2020-07-20 PROCEDURE — 1123F ACP DISCUSS/DSCN MKR DOCD: CPT | Performed by: FAMILY MEDICINE

## 2020-07-20 PROCEDURE — 4004F PT TOBACCO SCREEN RCVD TLK: CPT | Performed by: FAMILY MEDICINE

## 2020-07-20 PROCEDURE — 36415 COLL VENOUS BLD VENIPUNCTURE: CPT

## 2020-07-20 PROCEDURE — 3017F COLORECTAL CA SCREEN DOC REV: CPT | Performed by: FAMILY MEDICINE

## 2020-07-20 PROCEDURE — 69210 REMOVE IMPACTED EAR WAX UNI: CPT | Performed by: FAMILY MEDICINE

## 2020-07-20 PROCEDURE — 99214 OFFICE O/P EST MOD 30 MIN: CPT | Performed by: FAMILY MEDICINE

## 2020-07-20 PROCEDURE — 84443 ASSAY THYROID STIM HORMONE: CPT

## 2020-07-20 PROCEDURE — G8400 PT W/DXA NO RESULTS DOC: HCPCS | Performed by: FAMILY MEDICINE

## 2020-07-20 PROCEDURE — 80053 COMPREHEN METABOLIC PANEL: CPT

## 2020-07-20 PROCEDURE — 80061 LIPID PANEL: CPT

## 2020-07-20 PROCEDURE — 86803 HEPATITIS C AB TEST: CPT

## 2020-07-20 PROCEDURE — 4040F PNEUMOC VAC/ADMIN/RCVD: CPT | Performed by: FAMILY MEDICINE

## 2020-07-20 PROCEDURE — G8926 SPIRO NO PERF OR DOC: HCPCS | Performed by: FAMILY MEDICINE

## 2020-07-20 PROCEDURE — G8427 DOCREV CUR MEDS BY ELIG CLIN: HCPCS | Performed by: FAMILY MEDICINE

## 2020-07-20 PROCEDURE — G0438 PPPS, INITIAL VISIT: HCPCS | Performed by: FAMILY MEDICINE

## 2020-07-20 PROCEDURE — 1090F PRES/ABSN URINE INCON ASSESS: CPT | Performed by: FAMILY MEDICINE

## 2020-07-20 PROCEDURE — 3023F SPIROM DOC REV: CPT | Performed by: FAMILY MEDICINE

## 2020-07-20 PROCEDURE — G8417 CALC BMI ABV UP PARAM F/U: HCPCS | Performed by: FAMILY MEDICINE

## 2020-07-20 RX ORDER — PANTOPRAZOLE SODIUM 20 MG/1
TABLET, DELAYED RELEASE ORAL
Qty: 90 TABLET | Refills: 1 | Status: SHIPPED | OUTPATIENT
Start: 2020-07-20 | End: 2020-11-03 | Stop reason: SDUPTHER

## 2020-07-20 SDOH — ECONOMIC STABILITY: TRANSPORTATION INSECURITY
IN THE PAST 12 MONTHS, HAS THE LACK OF TRANSPORTATION KEPT YOU FROM MEDICAL APPOINTMENTS OR FROM GETTING MEDICATIONS?: NO

## 2020-07-20 SDOH — ECONOMIC STABILITY: FOOD INSECURITY: WITHIN THE PAST 12 MONTHS, THE FOOD YOU BOUGHT JUST DIDN'T LAST AND YOU DIDN'T HAVE MONEY TO GET MORE.: NEVER TRUE

## 2020-07-20 SDOH — ECONOMIC STABILITY: TRANSPORTATION INSECURITY
IN THE PAST 12 MONTHS, HAS LACK OF TRANSPORTATION KEPT YOU FROM MEETINGS, WORK, OR FROM GETTING THINGS NEEDED FOR DAILY LIVING?: NO

## 2020-07-20 SDOH — ECONOMIC STABILITY: INCOME INSECURITY: HOW HARD IS IT FOR YOU TO PAY FOR THE VERY BASICS LIKE FOOD, HOUSING, MEDICAL CARE, AND HEATING?: NOT HARD AT ALL

## 2020-07-20 SDOH — ECONOMIC STABILITY: FOOD INSECURITY: WITHIN THE PAST 12 MONTHS, YOU WORRIED THAT YOUR FOOD WOULD RUN OUT BEFORE YOU GOT MONEY TO BUY MORE.: NEVER TRUE

## 2020-07-20 ASSESSMENT — PATIENT HEALTH QUESTIONNAIRE - PHQ9
SUM OF ALL RESPONSES TO PHQ QUESTIONS 1-9: 0
SUM OF ALL RESPONSES TO PHQ QUESTIONS 1-9: 0

## 2020-07-20 ASSESSMENT — ENCOUNTER SYMPTOMS
GASTROINTESTINAL NEGATIVE: 1
EYES NEGATIVE: 1
RESPIRATORY NEGATIVE: 1

## 2020-07-20 NOTE — PROGRESS NOTES
Bilateral         Medications:       Prior to Admission medications    Medication Sig Start Date End Date Taking? Authorizing Provider   pantoprazole (PROTONIX) 20 MG tablet Take 1 tablet by mouth  daily 7/20/20  Yes Maddie Pandya,    amLODIPine (NORVASC) 5 MG tablet TAKE 1 TABLET BY MOUTH EVERY DAY 6/8/20  Yes Zak Persaud DO   traMADol (ULTRAM) 50 MG tablet Take 1 tablet by mouth every 6 hours as needed for Pain for up to 90 days. 6/8/20 9/6/20 Yes Zak Persaud DO   albuterol sulfate HFA (PROAIR HFA) 108 (90 Base) MCG/ACT inhaler Inhale 2 puffs into the lungs every 6 hours as needed for Wheezing 9/17/18  Yes IVANNA West CNP   docusate sodium (COLACE) 100 MG capsule Take 1 capsule by mouth daily as needed for Constipation. 11/26/14  Yes Netta Collins MD        Allergies:       Patient has no known allergies. Social History:     Tobacco:    reports that she has been smoking cigarettes. She has a 20.00 pack-year smoking history. She has never used smokeless tobacco.  Alcohol:      reports current alcohol use. Drug Use:  reports no history of drug use. Family History:     Family History   Problem Relation Age of Onset    Cancer Mother         COLON    Kidney Disease Father         ON DIALYSIS    Cancer Paternal Grandmother         SKIN       Review of Systems:     Positive and Negative as described in HPI    Review of Systems   Constitutional: Negative. HENT: Negative. Eyes: Negative. Respiratory: Negative. Cardiovascular: Negative. Gastrointestinal: Negative. Genitourinary: Negative. Musculoskeletal: Negative. Skin: Negative. Neurological: Negative. Hematological: Negative. Psychiatric/Behavioral: Positive for dysphoric mood (admits to feeling down but doesn't want to try medication).        Physical Exam:     Vitals:  /80   Pulse 69   Ht 5' (1.524 m)   Wt 129 lb (58.5 kg)   LMP 11/25/2003 (Approximate)   SpO2 98%   BMI 25.19 kg/m²   Physical Exam  Vitals signs and nursing note reviewed. Constitutional:       General: She is not in acute distress. Appearance: She is well-developed. HENT:      Head: Normocephalic and atraumatic. Right Ear: Tympanic membrane and ear canal normal. There is impacted cerumen. Left Ear: Tympanic membrane and ear canal normal. There is impacted cerumen. Ears:      Comments: samara cerumen impaction. Attempted irrigation R ear but was unsuccessful so then used lighted curette and successfully removed cerumen. Subsequent normal exam. L EAC cleared using warm water irrigation. Nose: Nose normal.   Eyes:      Conjunctiva/sclera: Conjunctivae normal.   Neck:      Musculoskeletal: Neck supple. Cardiovascular:      Rate and Rhythm: Normal rate and regular rhythm. Heart sounds: Normal heart sounds. Pulmonary:      Effort: Pulmonary effort is normal.      Breath sounds: Normal breath sounds. Lymphadenopathy:      Cervical: No cervical adenopathy. Skin:     General: Skin is warm and dry. Neurological:      Mental Status: She is alert and oriented to person, place, and time.    Psychiatric:         Judgment: Judgment normal.         Data:     Lab Results   Component Value Date     07/20/2020    K 4.6 07/20/2020     07/20/2020    CO2 29 07/20/2020    BUN 21 07/20/2020    CREATININE 0.71 07/20/2020    GLUCOSE 108 07/20/2020    PROT 7.2 07/20/2020    LABALBU 4.4 07/20/2020    BILITOT 0.40 07/20/2020    ALKPHOS 109 07/20/2020    AST 20 07/20/2020    ALT 6 07/20/2020     Lab Results   Component Value Date    WBC 7.3 06/11/2019    RBC 4.78 06/11/2019    HGB 15.3 06/11/2019    HCT 44.6 06/11/2019    MCV 93.2 06/11/2019    MCH 32.0 06/11/2019    MCHC 34.3 06/11/2019    RDW 14.1 06/11/2019     06/11/2019    MPV NOT REPORTED 06/11/2019     Lab Results   Component Value Date    TSH 1.71 07/20/2020     Lab Results   Component Value Date    CHOL 183 07/20/2020    HDL 72 07/20/2020         Assessment & Plan:        Diagnosis Orders   1. Routine general medical examination at a health care facility     2. Essential hypertension  Comprehensive Metabolic Panel    TSH with Reflex   3. Panlobular emphysema ([address]     4. Impacted cerumen of right ear  50144 - ID REMOVE IMPACTED EAR WAX   5. Impacted cerumen of left ear  ID REMOVAL IMPACTED CERUMEN IRRIGATION/LVG UNILAT   6. Screening for colorectal cancer  External Referral To Gastroenterology   7. Encounter for hepatitis C screening test for low risk patient  Hepatitis C Antibody   8. Screening for cardiovascular condition  Lipid Panel   Hypertension controlled, continue current Rx. Updating labs. COPD stable. Continue albuterol as needed, may use Advair if desired, and advised smoking cessation  Right ear cerumen impaction cleared using lighted curette  Left ear cerumen impaction cleared using warm water irrigation  Updating health maintenance        Requested Prescriptions     Signed Prescriptions Disp Refills    pantoprazole (PROTONIX) 20 MG tablet 90 tablet 1     Sig: Take 1 tablet by mouth  daily       Patient Instructions     SURVEY:    You may be receiving a survey from [employer] regarding your visit today. Please complete the survey to enable us to provide the highest quality of care to you and your family. If you cannot score us a very good on any question, please call the office to discuss how we could of made your experience a very good one. Thank you. Personalized Preventive Plan for Hyacinth Cortes - 7/20/2020  Medicare offers a range of preventive health benefits. Some of the tests and screenings are paid in full while other may be subject to a deductible, co-insurance, and/or copay. Some of these benefits include a comprehensive review of your medical history including lifestyle, illnesses that may run in your family, and various assessments and screenings as appropriate.     After reviewing your medical [id number]  Dept: [phone]

## 2020-07-20 NOTE — PATIENT INSTRUCTIONS
SURVEY:    You may be receiving a survey from [employer] regarding your visit today. Please complete the survey to enable us to provide the highest quality of care to you and your family. If you cannot score us a very good on any question, please call the office to discuss how we could of made your experience a very good one. Thank you. Personalized Preventive Plan for Traci Smyth - 7/20/2020  Medicare offers a range of preventive health benefits. Some of the tests and screenings are paid in full while other may be subject to a deductible, co-insurance, and/or copay. Some of these benefits include a comprehensive review of your medical history including lifestyle, illnesses that may run in your family, and various assessments and screenings as appropriate. After reviewing your medical record and screening and assessments performed today your provider may have ordered immunizations, labs, imaging, and/or referrals for you. A list of these orders (if applicable) as well as your Preventive Care list are included within your After Visit Summary for your review. Other Preventive Recommendations:    · A preventive eye exam performed by an eye specialist is recommended every 1-2 years to screen for glaucoma; cataracts, macular degeneration, and other eye disorders. · A preventive dental visit is recommended every 6 months. · Try to get at least 150 minutes of exercise per week or 10,000 steps per day on a pedometer . · Order or download the FREE \"Exercise & Physical Activity: Your Everyday Guide\" from [employer]. Call 8-216.529.5113 or search [employer] online. · You need 6845-1742 mg of calcium and 2823-0245 IU of vitamin D per day.  It is possible to meet your calcium requirement with diet alone, but a vitamin D supplement is usually necessary to meet this goal.  · When exposed to the sun, use a sunscreen that protects against both UVA and UVB radiation with an SPF of 30 or greater. Reapply every 2 to 3 hours or after sweating, drying off with a towel, or swimming. · Always wear a seat belt when traveling in a car. Always wear a helmet when riding a bicycle or motorcycle.

## 2020-07-21 NOTE — PROGRESS NOTES
Medicare Annual Wellness Visit  Name: Tati Pruitt Date: 2020   MRN: [id number] Sex: Female   Age: 76 y.o. Ethnicity: Non-/Non    : 1955 Race: Keiry Messina is here for Medicare AWV and Arthritis    Screenings for behavioral, psychosocial and functional/safety risks, and cognitive dysfunction are all negative except as indicated below. These results, as well as other patient data from the [address] form, are documented in Flowsheets linked to this Encounter. No Known Allergies    Prior to Visit Medications    Medication Sig Taking? Authorizing Provider   pantoprazole (PROTONIX) 20 MG tablet Take 1 tablet by mouth  daily Yes Merceda Blue, DO   amLODIPine (NORVASC) 5 MG tablet TAKE 1 TABLET BY MOUTH EVERY DAY Yes Merceda Blue, DO   traMADol (ULTRAM) 50 MG tablet Take 1 tablet by mouth every 6 hours as needed for Pain for up to 90 days. Yes Merceda Blue, DO   albuterol sulfate HFA (PROAIR HFA) 108 (90 Base) MCG/ACT inhaler Inhale 2 puffs into the lungs every 6 hours as needed for Wheezing Yes IVANNA Paul CNP   docusate sodium (COLACE) 100 MG capsule Take 1 capsule by mouth daily as needed for Constipation.  Yes Lawson Contreras MD       Past Medical History:   Diagnosis Date    Arthritis     Chronic kidney disease     Hydronephrosis    Depression     ON RX    Full dentures     Heart murmur     Hypertension     Scoliosis     chronic back pain    UPJ (ureteropelvic junction) obstruction 10/2014       Past Surgical History:   Procedure Laterality Date    CARPAL TUNNEL RELEASE Bilateral      SECTION      COLONOSCOPY      Multiple     CYSTOSCOPY Right 2014    rt stent with right pyeloplasty    CYSTOURETHROSCOPY Bilateral 2014    with right stent exchange and retro pylogram    HAND SURGERY Right     crushed    LASIK Bilateral     OTHER SURGICAL HISTORY  10-16-14    Cysto; right ureteral retrograde pyelogram & stent insertion    OTHER SURGICAL HISTORY  1993    TUBAL LIGATION REVERSAL    TUBAL LIGATION  1973    VARICOSE VEIN SURGERY Bilateral        Family History   Problem Relation Age of Onset    Cancer Mother         COLON    Kidney Disease Father         ON DIALYSIS    Cancer Paternal Grandmother         SKIN       CareTeam (Including outside providers/suppliers regularly involved in providing care):   Patient Care Team:  Marie Torres DO as PCP - General (Family Medicine)  Marie Torres DO as PCP - [facility] Empaneled Provider    Wt Readings from Last 3 Encounters:   07/20/20 129 lb (58.5 kg)   06/11/19 120 lb (54.4 kg)   12/13/18 115 lb (52.2 kg)     Vitals:    07/20/20 1510   BP: 126/80   Pulse: 69   SpO2: 98%   Weight: 129 lb (58.5 kg)   Height: 5' (1.524 m)     Body mass index is 25.19 kg/m². Based upon direct observation of the patient, evaluation of cognition reveals recent and remote memory intact. Patient's complete Health Risk Assessment and screening values have been reviewed and are found in Flowsheets. The following problems were reviewed today and where indicated follow up appointments were made and/or referrals ordered.     Positive Risk Factor Screenings with Interventions:     Substance Abuse:  Social History     Tobacco History     Smoking Status  Current Every Day Smoker Smoking Frequency  1 pack/day for 20 years (20 pk yrs) Smoking Tobacco Type  Cigarettes    Smokeless Tobacco Use  Never Used          Alcohol History     Alcohol Use Status  Yes Comment  WINE ONCE A MONTH          Drug Use     Drug Use Status  No Comment  1 pack a day          Sexual Activity     Sexually Active  Not Asked                  Substance Abuse Interventions:  · Tobacco abuse:  tobacco cessation tips and resources provided    Hearing/Vision:  No exam data present  Hearing/Vision  Do you or your family notice any trouble with your hearing?: No  Do you have difficulty driving, watching TV, or doing any of your daily activities because of your eyesight?: No  Have you had an eye exam within the past year?: (!) No  Hearing/Vision Interventions:  · Vision concerns:  patient encouraged to make appointment with his/her eye specialist    Personalized Preventive Plan   Current Health Maintenance Status  Immunization History   Administered Date(s) Administered    Influenza Virus Vaccine 10/13/2017    Influenza, Quadv, IM, PF (6 mo and older Fluzone, Flulaval, Fluarix, and 3 yrs and older Afluria) 11/15/2015, 10/13/2017    Pneumococcal Conjugate 13-valent ([id number]) 12/12/2015    Pneumococcal Polysaccharide ([id number]) 11/12/2016    Tdap (Boostrix, Adacel) 12/12/2015, 04/10/2017    Zoster Live (Zostavax) 10/31/2015        Health Maintenance   Topic Date Due    Hepatitis C screen  1955    Lipid screen  01/10/1994    Diabetes screen  01/10/1994    DEXA (modify frequency per FRAX score)  01/10/2009    Shingles Vaccine (2 of 3) 12/26/2015    Annual Wellness Visit (AWV)  06/11/2019    Breast cancer screen  08/02/2019    Flu vaccine (1) 09/01/2020    Pneumococcal 65+ years Vaccine (2 of 2 - PPSV23) 11/12/2021    DTaP/Tdap/Td vaccine (3 - Td) 04/10/2027    Colon cancer screen colonoscopy  05/22/2027    Hepatitis A vaccine  Aged Out    Hepatitis B vaccine  Aged Out    Hib vaccine  Aged Out    Meningococcal (ACWY) vaccine  Aged Out     Recommendations for [employer] Due: see orders and patient instructions/AVS.  . Recommended screening schedule for the next 5-10 years is provided to the patient in written form: see Patient Instructions/AVS.    Moreno Zhang was seen today for medicare awv and arthritis. Diagnoses and all orders for this visit:    Screening for colorectal cancer  -     External Referral To Gastroenterology    Encounter for hepatitis C screening test for low risk patient  -     Hepatitis C Antibody;  Future    Screening for cardiovascular condition  -     Lipid Panel; Future    Essential hypertension  -     Comprehensive Metabolic Panel; Future  -     TSH with Reflex; Future    Other orders  -     pantoprazole (PROTONIX) 20 MG tablet; Take 1 tablet by mouth  daily            Updating labs. Patient had mammogram in [place]. Unsure when her last DEXA was so we will look into that and likely order that in the near future also.

## 2020-10-01 RX ORDER — AMLODIPINE BESYLATE 5 MG/1
TABLET ORAL
Qty: 90 TABLET | Refills: 1 | Status: SHIPPED | OUTPATIENT
Start: 2020-10-01 | End: 2021-04-01

## 2020-10-01 NOTE — TELEPHONE ENCOUNTER
Last OV: 7/20/2020  Last RX: 06/08/2020   Next scheduled apt: Visit date not found    Medication pending. Health Maintenance   Topic Date Due    Diabetes screen  01/10/1994    DEXA (modify frequency per FRAX score)  01/10/2009    Shingles Vaccine (2 of 3) 12/26/2015    Breast cancer screen  08/02/2019    Flu vaccine (1) 09/01/2020    Annual Wellness Visit (AWV)  07/21/2021    Pneumococcal 65+ years Vaccine (2 of 2 - PPSV23) 11/12/2021    Lipid screen  07/20/2025    DTaP/Tdap/Td vaccine (3 - Td) 04/10/2027    Colon cancer screen colonoscopy  08/14/2030    Hepatitis C screen  Completed    Hepatitis A vaccine  Aged Out    Hepatitis B vaccine  Aged Out    Hib vaccine  Aged Out    Meningococcal (ACWY) vaccine  Aged Out             (applicable per patient's age: Cancer Screenings, Depression Screening, Fall Risk Screening, Immunizations)    LDL Cholesterol (mg/dL)   Date Value   07/20/2020 80     AST (U/L)   Date Value   07/20/2020 20     ALT (U/L)   Date Value   07/20/2020 6     BUN (mg/dL)   Date Value   07/20/2020 21      (goal A1C is < 7)   (goal LDL is <100) need 30-50% reduction from baseline     BP Readings from Last 3 Encounters:   07/20/20 126/80   06/11/19 100/66   12/13/18 122/80    (goal /80)      All Future Testing planned in [facility]:  Lab Frequency Next Occurrence       Next Visit Date:  No future appointments.          Patient Active Problem List:     Dysthymia     Arthritis     Amaurosis fugax     History of laser assisted in situ keratomileusis     Panlobular emphysema ([facility] [address]

## 2020-10-02 ENCOUNTER — TELEPHONE (OUTPATIENT)
Dept: PRIMARY CARE CLINIC | Age: 65
End: 2020-10-02

## 2020-10-02 ENCOUNTER — TELEPHONE (OUTPATIENT)
Dept: FAMILY MEDICINE CLINIC | Age: 65
End: 2020-10-02

## 2020-10-02 RX ORDER — TRAMADOL HYDROCHLORIDE 50 MG/1
50 TABLET ORAL EVERY 6 HOURS PRN
Qty: 120 TABLET | Refills: 0 | Status: CANCELLED | OUTPATIENT
Start: 2020-10-02 | End: 2020-12-31

## 2020-10-02 RX ORDER — TRAMADOL HYDROCHLORIDE 50 MG/1
50 TABLET ORAL EVERY 6 HOURS PRN
Qty: 120 TABLET | Refills: 0 | Status: SHIPPED | OUTPATIENT
Start: 2020-10-02 | End: 2020-12-22 | Stop reason: SDUPTHER

## 2020-10-02 RX ORDER — TRAMADOL HYDROCHLORIDE 50 MG/1
50 TABLET ORAL EVERY 6 HOURS PRN
Qty: 120 TABLET | Refills: 0 | Status: SHIPPED | OUTPATIENT
Start: 2020-10-02 | End: 2020-10-02 | Stop reason: SDUPTHER

## 2020-11-03 RX ORDER — PANTOPRAZOLE SODIUM 20 MG/1
TABLET, DELAYED RELEASE ORAL
Qty: 90 TABLET | Refills: 1 | Status: SHIPPED | OUTPATIENT
Start: 2020-11-03 | End: 2021-07-21 | Stop reason: SDUPTHER

## 2020-11-03 NOTE — TELEPHONE ENCOUNTER
protonix 20 mg    [facility]   Topic Date Due    Diabetes screen  01/10/1994    DEXA (modify frequency per FRAX score)  01/10/2009    Shingles Vaccine (2 of 3) 12/26/2015    Breast cancer screen  08/02/2019    Flu vaccine (1) 09/01/2020    Annual Wellness Visit (AWV)  07/21/2021    Pneumococcal 65+ years Vaccine (2 of 2 - PPSV23) 11/12/2021    Lipid screen  07/20/2025    DTaP/Tdap/Td vaccine (3 - Td) 04/10/2027    Colon cancer screen colonoscopy  08/14/2030    Hepatitis C screen  Completed    Hepatitis A vaccine  Aged Out    Hepatitis B vaccine  Aged Out    Hib vaccine  Aged Out    Meningococcal (ACWY) vaccine  Aged Out             (applicable per patient's age: Cancer Screenings, Depression Screening, Fall Risk Screening, Immunizations)    LDL Cholesterol (mg/dL)   Date Value   07/20/2020 80     AST (U/L)   Date Value   07/20/2020 20     ALT (U/L)   Date Value   07/20/2020 6     BUN (mg/dL)   Date Value   07/20/2020 21      (goal A1C is < 7)   (goal LDL is <100) need 30-50% reduction from baseline     BP Readings from Last 3 Encounters:   07/20/20 126/80   06/11/19 100/66   12/13/18 122/80    (goal /80)      All Future Testing planned in [facility]:  Lab Frequency Next Occurrence       Next Visit Date:  No future appointments.          Patient Active Problem List:     Dysthymia     Arthritis     Amaurosis fugax     History of laser assisted in situ keratomileusis     Panlobular emphysema ([facility] [address]

## 2020-12-21 NOTE — TELEPHONE ENCOUNTER
Patient left a message asking for a refill on tramadol 50 mg. Patient last seen 7/20/20 for Medicare wellness visit. [facility] Maintenance   Topic Date Due    Diabetes screen  01/10/1994    DEXA (modify frequency per FRAX score)  01/10/2009    Shingles Vaccine (2 of 3) 12/26/2015    Breast cancer screen  08/02/2019    Flu vaccine (1) 09/01/2020    Annual Wellness Visit (AWV)  07/21/2021    Pneumococcal 65+ years Vaccine (2 of 2 - PPSV23) 11/12/2021    Lipid screen  07/20/2025    DTaP/Tdap/Td vaccine (3 - Td) 04/10/2027    Colon cancer screen colonoscopy  08/14/2030    Hepatitis C screen  Completed    Hepatitis A vaccine  Aged Out    Hepatitis B vaccine  Aged Out    Hib vaccine  Aged Out    Meningococcal (ACWY) vaccine  Aged Out             (applicable per patient's age: Cancer Screenings, Depression Screening, Fall Risk Screening, Immunizations)    LDL Cholesterol (mg/dL)   Date Value   07/20/2020 80     AST (U/L)   Date Value   07/20/2020 20     ALT (U/L)   Date Value   07/20/2020 6     BUN (mg/dL)   Date Value   07/20/2020 21      (goal A1C is < 7)   (goal LDL is <100) need 30-50% reduction from baseline     BP Readings from Last 3 Encounters:   07/20/20 126/80   06/11/19 100/66   12/13/18 122/80    (goal /80)      All Future Testing planned in CarePATH:  Lab Frequency Next Occurrence       Next Visit Date:  No future appointments.          Patient Active Problem List:     Dysthymia     Arthritis     Amaurosis fugax     History of laser assisted in situ keratomileusis     Panlobular emphysema ([facility] [address]

## 2020-12-22 RX ORDER — TRAMADOL HYDROCHLORIDE 50 MG/1
50 TABLET ORAL EVERY 6 HOURS PRN
Qty: 120 TABLET | Refills: 0 | Status: SHIPPED | OUTPATIENT
Start: 2020-12-22 | End: 2021-03-08 | Stop reason: SDUPTHER

## 2021-03-08 DIAGNOSIS — M54.50 CHRONIC BILATERAL LOW BACK PAIN WITHOUT SCIATICA: ICD-10-CM

## 2021-03-08 DIAGNOSIS — G89.29 CHRONIC BILATERAL LOW BACK PAIN WITHOUT SCIATICA: ICD-10-CM

## 2021-03-08 RX ORDER — TRAMADOL HYDROCHLORIDE 50 MG/1
50 TABLET ORAL EVERY 6 HOURS PRN
Qty: 120 TABLET | Refills: 0 | Status: SHIPPED | OUTPATIENT
Start: 2021-03-08 | End: 2021-06-08 | Stop reason: SDUPTHER

## 2021-03-08 NOTE — TELEPHONE ENCOUNTER
Tramadol 50 mg    Delphine    AWV - 7/20/20. No future appointment. Health Maintenance   Topic Date Due    COVID-19 Vaccine (1 of 2) Never done    Diabetes screen  Never done    DEXA (modify frequency per FRAX score)  Never done    Shingles Vaccine (2 of 3) 12/26/2015    Breast cancer screen  08/02/2019    Flu vaccine (1) 09/01/2020    Annual Wellness Visit (AWV)  07/21/2021    Pneumococcal 65+ years Vaccine (2 of 2 - PPSV23) 11/12/2021    Lipid screen  07/20/2025    DTaP/Tdap/Td vaccine (3 - Td) 04/10/2027    Colon cancer screen colonoscopy  08/14/2030    Hepatitis C screen  Completed    Hepatitis A vaccine  Aged Out    Hepatitis B vaccine  Aged Out    Hib vaccine  Aged Out    Meningococcal (ACWY) vaccine  Aged Out             (applicable per patient's age: Cancer Screenings, Depression Screening, Fall Risk Screening, Immunizations)    LDL Cholesterol (mg/dL)   Date Value   07/20/2020 80     AST (U/L)   Date Value   07/20/2020 20     ALT (U/L)   Date Value   07/20/2020 6     BUN (mg/dL)   Date Value   07/20/2020 21      (goal A1C is < 7)   (goal LDL is <100) need 30-50% reduction from baseline     BP Readings from Last 3 Encounters:   07/20/20 126/80   06/11/19 100/66   12/13/18 122/80    (goal /80)      All Future Testing planned in CarePATH:  Lab Frequency Next Occurrence       Next Visit Date:  No future appointments.          Patient Active Problem List:     Dysthymia     Arthritis     Amaurosis fugax     History of laser assisted in situ keratomileusis     Panlobular emphysema ([facility] [address]

## 2021-04-01 RX ORDER — AMLODIPINE BESYLATE 5 MG/1
TABLET ORAL
Qty: 90 TABLET | Refills: 1 | Status: SHIPPED | OUTPATIENT
Start: 2021-04-01 | End: 2021-07-21 | Stop reason: SDUPTHER

## 2021-04-01 NOTE — TELEPHONE ENCOUNTER
Last visit:  7/20/2020  Next Visit Date:  No future appointments. Medication List:  Prior to Admission medications    Medication Sig Start Date End Date Taking? Authorizing Provider   traMADol (ULTRAM) 50 MG tablet Take 1 tablet by mouth every 6 hours as needed for Pain for up to 90 days. 3/8/21 6/6/21  Gaurang Chavez, DO   pantoprazole (PROTONIX) 20 MG tablet Take 1 tablet by mouth  daily 11/3/20   Gaurang Chavez, DO   amLODIPine (NORVASC) 5 MG tablet TAKE 1 TABLET BY MOUTH EVERY DAY 10/1/20   Gaurang Chavez, DO   albuterol sulfate HFA (PROAIR HFA) 108 (90 Base) MCG/ACT inhaler Inhale 2 puffs into the lungs every 6 hours as needed for Wheezing 9/17/18   IVANNA Macias CNP   docusate sodium (COLACE) 100 MG capsule Take 1 capsule by mouth daily as needed for Constipation.  11/26/14   Leilani Haynes MD

## 2021-06-08 DIAGNOSIS — M54.50 CHRONIC BILATERAL LOW BACK PAIN WITHOUT SCIATICA: ICD-10-CM

## 2021-06-08 DIAGNOSIS — G89.29 CHRONIC BILATERAL LOW BACK PAIN WITHOUT SCIATICA: ICD-10-CM

## 2021-06-08 RX ORDER — TRAMADOL HYDROCHLORIDE 50 MG/1
50 TABLET ORAL EVERY 6 HOURS PRN
Qty: 120 TABLET | Refills: 0 | Status: SHIPPED | OUTPATIENT
Start: 2021-06-08 | End: 2021-08-31 | Stop reason: SDUPTHER

## 2021-06-08 NOTE — TELEPHONE ENCOUNTER
Tramadol 50 mg    Brandon    AWV done 7/20/20. Health Maintenance   Topic Date Due    COVID-19 Vaccine (1) Never done    Diabetes screen  Never done    DEXA (modify frequency per FRAX score)  Never done    Shingles Vaccine (2 of 3) 12/26/2015    Breast cancer screen  08/02/2019    Annual Wellness Visit (AWV)  07/21/2021    Flu vaccine (Season Ended) 09/01/2021    Pneumococcal 65+ years Vaccine (2 of 2 - PPSV23) 11/12/2021    Lipid screen  07/20/2025    DTaP/Tdap/Td vaccine (3 - Td or Tdap) 04/10/2027    Colon cancer screen colonoscopy  08/14/2030    Hepatitis C screen  Completed    Hepatitis A vaccine  Aged Out    Hepatitis B vaccine  Aged Out    Hib vaccine  Aged Out    Meningococcal (ACWY) vaccine  Aged Out             (applicable per patient's age: Cancer Screenings, Depression Screening, Fall Risk Screening, Immunizations)    LDL Cholesterol (mg/dL)   Date Value   07/20/2020 80     AST (U/L)   Date Value   07/20/2020 20     ALT (U/L)   Date Value   07/20/2020 6     BUN (mg/dL)   Date Value   07/20/2020 21      (goal A1C is < 7)   (goal LDL is <100) need 30-50% reduction from baseline     BP Readings from Last 3 Encounters:   07/20/20 126/80   06/11/19 100/66   12/13/18 122/80    (goal /80)      All Future Testing planned in CarePATH:  Lab Frequency Next Occurrence       Next Visit Date:  No future appointments.          Patient Active Problem List:     Dysthymia     Arthritis     Amaurosis fugax     History of laser assisted in situ keratomileusis     Panlobular emphysema ([facility] [address]

## 2021-06-08 NOTE — TELEPHONE ENCOUNTER
VM full, due to schedule for AWV after 7/20      Last visit:  7/20/2020  Next Visit Date:  No future appointments. Medication List:  Prior to Admission medications    Medication Sig Start Date End Date Taking? Authorizing Provider   amLODIPine (NORVASC) 5 MG tablet TAKE 1 TABLET BY MOUTH EVERY DAY 4/1/21   Gasper Mason, DO   pantoprazole (PROTONIX) 20 MG tablet Take 1 tablet by mouth  daily 11/3/20   Gasper Mason, DO   albuterol sulfate HFA (PROAIR HFA) 108 (90 Base) MCG/ACT inhaler Inhale 2 puffs into the lungs every 6 hours as needed for Wheezing 9/17/18   Kelsi Youssef APRN - CNP   docusate sodium (COLACE) 100 MG capsule Take 1 capsule by mouth daily as needed for Constipation.  11/26/14   Tonia Fair MD

## 2021-07-21 ENCOUNTER — OFFICE VISIT (OUTPATIENT)
Dept: FAMILY MEDICINE CLINIC | Age: 66
End: 2021-07-21
Payer: MEDICARE

## 2021-07-21 ENCOUNTER — HOSPITAL ENCOUNTER (OUTPATIENT)
Age: 66
Discharge: HOME | End: 2021-07-21
Payer: MEDICARE

## 2021-07-21 VITALS
HEIGHT: 60 IN | WEIGHT: 128 LBS | HEART RATE: 65 BPM | BODY MASS INDEX: 25.13 KG/M2 | DIASTOLIC BLOOD PRESSURE: 70 MMHG | OXYGEN SATURATION: 96 % | SYSTOLIC BLOOD PRESSURE: 112 MMHG

## 2021-07-21 DIAGNOSIS — J43.1 PANLOBULAR EMPHYSEMA (HCC): ICD-10-CM

## 2021-07-21 DIAGNOSIS — D48.5 NEOPLASM OF UNCERTAIN BEHAVIOR OF SKIN OF LOWER EXTREMITY: ICD-10-CM

## 2021-07-21 DIAGNOSIS — Z13.6 SCREENING FOR CARDIOVASCULAR CONDITION: ICD-10-CM

## 2021-07-21 DIAGNOSIS — N39.46 MIXED INCONTINENCE: ICD-10-CM

## 2021-07-21 DIAGNOSIS — Z00.00 ROUTINE GENERAL MEDICAL EXAMINATION AT A HEALTH CARE FACILITY: Primary | ICD-10-CM

## 2021-07-21 DIAGNOSIS — I10 ESSENTIAL HYPERTENSION: ICD-10-CM

## 2021-07-21 LAB
ALBUMIN SERPL-MCNC: 4.4 G/DL (ref 3.5–5.2)
ALBUMIN/GLOBULIN RATIO: ABNORMAL (ref 1–2.5)
ALP BLD-CCNC: 141 U/L (ref 35–104)
ALT SERPL-CCNC: 10 U/L (ref 5–33)
ANION GAP SERPL CALCULATED.3IONS-SCNC: 12 MMOL/L (ref 9–17)
AST SERPL-CCNC: 23 U/L
BILIRUB SERPL-MCNC: 0.46 MG/DL (ref 0.3–1.2)
BUN BLDV-MCNC: 12 MG/DL (ref 8–23)
BUN/CREAT BLD: 23 (ref 9–20)
CALCIUM SERPL-MCNC: 9.4 MG/DL (ref 8.6–10.4)
CHLORIDE BLD-SCNC: 98 MMOL/L (ref 98–107)
CO2: 29 MMOL/L (ref 20–31)
CREAT SERPL-MCNC: 0.53 MG/DL (ref 0.5–0.9)
GFR AFRICAN AMERICAN: >60 ML/MIN
GFR NON-AFRICAN AMERICAN: >60 ML/MIN
GFR SERPL CREATININE-BSD FRML MDRD: ABNORMAL ML/MIN/{1.73_M2}
GFR SERPL CREATININE-BSD FRML MDRD: ABNORMAL ML/MIN/{1.73_M2}
GLUCOSE BLD-MCNC: 85 MG/DL (ref 70–99)
PATIENT FASTING?: YES
POTASSIUM SERPL-SCNC: 4.4 MMOL/L (ref 3.7–5.3)
SODIUM BLD-SCNC: 139 MMOL/L (ref 135–144)
TOTAL PROTEIN: 7.4 G/DL (ref 6.4–8.3)

## 2021-07-21 PROCEDURE — 80053 COMPREHEN METABOLIC PANEL: CPT

## 2021-07-21 PROCEDURE — G0439 PPPS, SUBSEQ VISIT: HCPCS | Performed by: FAMILY MEDICINE

## 2021-07-21 PROCEDURE — 1090F PRES/ABSN URINE INCON ASSESS: CPT | Performed by: FAMILY MEDICINE

## 2021-07-21 PROCEDURE — 3017F COLORECTAL CA SCREEN DOC REV: CPT | Performed by: FAMILY MEDICINE

## 2021-07-21 PROCEDURE — 3023F SPIROM DOC REV: CPT | Performed by: FAMILY MEDICINE

## 2021-07-21 PROCEDURE — 36415 COLL VENOUS BLD VENIPUNCTURE: CPT

## 2021-07-21 PROCEDURE — 80061 LIPID PANEL: CPT

## 2021-07-21 PROCEDURE — G8427 DOCREV CUR MEDS BY ELIG CLIN: HCPCS | Performed by: FAMILY MEDICINE

## 2021-07-21 PROCEDURE — 99214 OFFICE O/P EST MOD 30 MIN: CPT | Performed by: FAMILY MEDICINE

## 2021-07-21 PROCEDURE — 1123F ACP DISCUSS/DSCN MKR DOCD: CPT | Performed by: FAMILY MEDICINE

## 2021-07-21 PROCEDURE — 4004F PT TOBACCO SCREEN RCVD TLK: CPT | Performed by: FAMILY MEDICINE

## 2021-07-21 PROCEDURE — G8926 SPIRO NO PERF OR DOC: HCPCS | Performed by: FAMILY MEDICINE

## 2021-07-21 PROCEDURE — 4040F PNEUMOC VAC/ADMIN/RCVD: CPT | Performed by: FAMILY MEDICINE

## 2021-07-21 PROCEDURE — G8400 PT W/DXA NO RESULTS DOC: HCPCS | Performed by: FAMILY MEDICINE

## 2021-07-21 PROCEDURE — G8417 CALC BMI ABV UP PARAM F/U: HCPCS | Performed by: FAMILY MEDICINE

## 2021-07-21 PROCEDURE — 0509F URINE INCON PLAN DOCD: CPT | Performed by: FAMILY MEDICINE

## 2021-07-21 RX ORDER — PANTOPRAZOLE SODIUM 20 MG/1
TABLET, DELAYED RELEASE ORAL
Qty: 90 TABLET | Refills: 3 | Status: SHIPPED | OUTPATIENT
Start: 2021-07-21 | End: 2022-01-18 | Stop reason: SDUPTHER

## 2021-07-21 RX ORDER — OXYBUTYNIN CHLORIDE 10 MG/1
10 TABLET, EXTENDED RELEASE ORAL DAILY
Qty: 90 TABLET | Refills: 0 | Status: SHIPPED | OUTPATIENT
Start: 2021-07-21 | End: 2021-10-11 | Stop reason: SDUPTHER

## 2021-07-21 RX ORDER — AMLODIPINE BESYLATE 5 MG/1
TABLET ORAL
Qty: 90 TABLET | Refills: 3 | Status: SHIPPED | OUTPATIENT
Start: 2021-07-21 | End: 2022-09-15 | Stop reason: SDUPTHER

## 2021-07-21 SDOH — ECONOMIC STABILITY: FOOD INSECURITY: WITHIN THE PAST 12 MONTHS, YOU WORRIED THAT YOUR FOOD WOULD RUN OUT BEFORE YOU GOT MONEY TO BUY MORE.: NEVER TRUE

## 2021-07-21 SDOH — ECONOMIC STABILITY: FOOD INSECURITY: WITHIN THE PAST 12 MONTHS, THE FOOD YOU BOUGHT JUST DIDN'T LAST AND YOU DIDN'T HAVE MONEY TO GET MORE.: NEVER TRUE

## 2021-07-21 ASSESSMENT — PATIENT HEALTH QUESTIONNAIRE - PHQ9
SUM OF ALL RESPONSES TO PHQ QUESTIONS 1-9: 0
SUM OF ALL RESPONSES TO PHQ9 QUESTIONS 1 & 2: 0
2. FEELING DOWN, DEPRESSED OR HOPELESS: 0
SUM OF ALL RESPONSES TO PHQ QUESTIONS 1-9: 0
1. LITTLE INTEREST OR PLEASURE IN DOING THINGS: 0
SUM OF ALL RESPONSES TO PHQ QUESTIONS 1-9: 0

## 2021-07-21 ASSESSMENT — SOCIAL DETERMINANTS OF HEALTH (SDOH): HOW HARD IS IT FOR YOU TO PAY FOR THE VERY BASICS LIKE FOOD, HOUSING, MEDICAL CARE, AND HEATING?: NOT HARD AT ALL

## 2021-07-21 ASSESSMENT — LIFESTYLE VARIABLES: HOW OFTEN DO YOU HAVE A DRINK CONTAINING ALCOHOL: 0

## 2021-07-21 NOTE — PROGRESS NOTES
Name: Addi Guo  : 1955         Chief Complaint:     Chief Complaint   Patient presents with    Medicare AWV       History of Present Illness:      Addi Guo is a 78 y.o.  female who presents with Medicare AWV      HPI     HTN controlled. Compliant with medication, tolerating well. Denies any chest pain, shortness of breath, loss of exercise tolerance. Mixed incontinence, gradual worsening, no prolapse. Eyes water a lot, wakes up that way in the morning. Skin lesion R anterior knee present for at least a few mos, has tried to cut it off but it comes back. Unsure whether it's grown. Past Medical History:     Past Medical History:   Diagnosis Date    Arthritis     Chronic kidney disease     Hydronephrosis    Depression     ON RX    Full dentures     Heart murmur     Hypertension     Panlobular emphysema ([address] 2020    Scoliosis     chronic back pain    UPJ (ureteropelvic junction) obstruction 10/2014        Past Surgical History:     Past Surgical History:   Procedure Laterality Date    CARPAL TUNNEL RELEASE Bilateral      SECTION      COLONOSCOPY      Multiple     CYSTOSCOPY Right 2014    rt stent with right pyeloplasty    CYSTOURETHROSCOPY Bilateral 2014    with right stent exchange and retro pylogram    HAND SURGERY Right     crushed    LASIK Bilateral     OTHER SURGICAL HISTORY  10-16-14    Cysto; right ureteral retrograde pyelogram & stent insertion    OTHER SURGICAL HISTORY      TUBAL LIGATION REVERSAL    TUBAL LIGATION  1973    VARICOSE VEIN SURGERY Bilateral         Medications:       Prior to Admission medications    Medication Sig Start Date End Date Taking?  Authorizing Provider   amLODIPine (NORVASC) 5 MG tablet 1 by mouth every day 21  Yes Donell Francisco, DO   pantoprazole (PROTONIX) 20 MG tablet Take 1 tablet by mouth  daily 21  Yes Donell Francisco, DO   oxybutynin (DITROPAN XL) 10 MG exercises. Requested Prescriptions     Signed Prescriptions Disp Refills    amLODIPine (NORVASC) 5 MG tablet 90 tablet 3     Si by mouth every day    pantoprazole (PROTONIX) 20 MG tablet 90 tablet 3     Sig: Take 1 tablet by mouth  daily    oxybutynin (DITROPAN XL) 10 MG extended release tablet 90 tablet 0     Sig: Take 1 tablet by mouth daily       Patient Instructions   SURVEY:    You may be receiving a survey from [employer] regarding your visit today. You may get this in the mail, through your MyChart or in your email. Please complete the survey to enable us to provide the highest quality of care to you and your family. If you cannot score us as very good ( 5 Stars) on any question, please feel free to call the office to discuss how we could have made your experience exceptional.     Thank you. Clinical Care Team:  Dr. Lavinia Johansen, DO Emre Santiagoy, [address] [place] [address] Team:  [address]    Personalized Preventive Plan for Efren Kaye - 2021  Medicare offers a range of preventive health benefits. Some of the tests and screenings are paid in full while other may be subject to a deductible, co-insurance, and/or copay. Some of these benefits include a comprehensive review of your medical history including lifestyle, illnesses that may run in your family, and various assessments and screenings as appropriate. After reviewing your medical record and screening and assessments performed today your provider may have ordered immunizations, labs, imaging, and/or referrals for you. A list of these orders (if applicable) as well as your Preventive Care list are included within your After Visit Summary for your review.     Other Preventive Recommendations:    · A preventive eye exam performed by an eye specialist is recommended every 1-2 years to screen for

## 2021-07-21 NOTE — PATIENT INSTRUCTIONS
SURVEY:    You may be receiving a survey from [employer] regarding your visit today. You may get this in the mail, through your MyChart or in your email. Please complete the survey to enable us to provide the highest quality of care to you and your family. If you cannot score us as very good ( 5 Stars) on any question, please feel free to call the office to discuss how we could have made your experience exceptional.     Thank you. Clinical Care Team:  Dr. Gema Mendoza, DO Tim Hannah, [address] [place] [address] Team:  [address] [place] [address]    Personalized Preventive Plan for Otilio Echeverria - 7/21/2021  Medicare offers a range of preventive health benefits. Some of the tests and screenings are paid in full while other may be subject to a deductible, co-insurance, and/or copay. Some of these benefits include a comprehensive review of your medical history including lifestyle, illnesses that may run in your family, and various assessments and screenings as appropriate. After reviewing your medical record and screening and assessments performed today your provider may have ordered immunizations, labs, imaging, and/or referrals for you. A list of these orders (if applicable) as well as your Preventive Care list are included within your After Visit Summary for your review. Other Preventive Recommendations:    · A preventive eye exam performed by an eye specialist is recommended every 1-2 years to screen for glaucoma; cataracts, macular degeneration, and other eye disorders. · A preventive dental visit is recommended every 6 months. · Try to get at least 150 minutes of exercise per week or 10,000 steps per day on a pedometer . · Order or download the FREE \"Exercise & Physical Activity: Your Everyday Guide\" from [employer].  Call [phone] or search [facility]

## 2021-07-21 NOTE — PROGRESS NOTES
Medicare Annual Wellness Visit  Name: Poonam Cortes Date: 2021   MRN: [id number] Sex: Female   Age: 78 y.o. Ethnicity: Non- / Non    : 1955 Race: White (non-)      Gil Reason is here for Medicare AWV    Screenings for behavioral, psychosocial and functional/safety risks, and cognitive dysfunction are all negative except as indicated below. These results, as well as other patient data from the [address] [facility] [address] form, are documented in Flowsheets linked to this Encounter. No Known Allergies    Prior to Visit Medications    Medication Sig Taking? Authorizing Provider   traMADol (ULTRAM) 50 MG tablet Take 1 tablet by mouth every 6 hours as needed for Pain for up to 90 days. Yes Lucía Ferraro,    amLODIPine (NORVASC) 5 MG tablet TAKE 1 TABLET BY MOUTH EVERY DAY Yes Lucía Ferraro DO   pantoprazole (PROTONIX) 20 MG tablet Take 1 tablet by mouth  daily Yes Lucía Ferraro DO   albuterol sulfate HFA (PROAIR HFA) 108 (90 Base) MCG/ACT inhaler Inhale 2 puffs into the lungs every 6 hours as needed for Wheezing Yes Herbie Blind, APRN - CNP   docusate sodium (COLACE) 100 MG capsule Take 1 capsule by mouth daily as needed for Constipation.  Yes Dona Carolina MD       Past Medical History:   Diagnosis Date    Arthritis     Chronic kidney disease     Hydronephrosis    Depression     ON RX    Full dentures     Heart murmur     Hypertension     Panlobular emphysema ([address] 2020    Scoliosis     chronic back pain    UPJ (ureteropelvic junction) obstruction 10/2014       Past Surgical History:   Procedure Laterality Date    CARPAL TUNNEL RELEASE Bilateral      SECTION      COLONOSCOPY      Multiple     CYSTOSCOPY Right 2014    rt stent with right pyeloplasty    CYSTOURETHROSCOPY Bilateral 2014    with right stent exchange and retro pylogram    HAND SURGERY Right     crushed    LASIK Bilateral     OTHER SURGICAL HISTORY  10-16-14    Cysto; right ureteral retrograde pyelogram & stent insertion    OTHER SURGICAL HISTORY  1993    TUBAL LIGATION REVERSAL    TUBAL LIGATION  1973    VARICOSE VEIN SURGERY Bilateral        Family History   Problem Relation Age of Onset    Cancer Mother         COLON    Kidney Disease Father         ON DIALYSIS    Cancer Paternal Grandmother         SKIN       CareTeam (Including outside providers/suppliers regularly involved in providing care):   Patient Care Team:  Nathaniel Richards DO as PCP - General (Family Medicine)  Nathaniel Richards DO as PCP - [facility][1] Empaneled Provider    Wt Readings from Last 3 Encounters:   07/21/21 128 lb (58.1 kg)   07/20/20 129 lb (58.5 kg)   06/11/19 120 lb (54.4 kg)     Vitals:    07/21/21 1330   BP: 112/70   Pulse: 65   SpO2: 96%   Weight: 128 lb (58.1 kg)   Height: 5' (1.524 m)     Body mass index is 25 kg/m². Based upon direct observation of the patient, evaluation of cognition reveals recent and remote memory intact. Patient's complete Health Risk Assessment and screening values have been reviewed and are found in Flowsheets. The following problems were reviewed today and where indicated follow up appointments were made and/or referrals ordered. Positive Risk Factor Screenings with Interventions:         Substance History:  Social History     Tobacco History     Smoking Status  Current Every Day Smoker Smoking Frequency  1 pack/day for 20 years (20 pk yrs) Smoking Tobacco Type  Cigarettes    Smokeless Tobacco Use  Never Used          Alcohol History     Alcohol Use Status  Yes Comment  WINE ONCE A MONTH          Drug Use     Drug Use Status  No Comment  1 pack a day          Sexual Activity     Sexually Active  Not Asked               Alcohol Screening:       A score of 8 or more is associated with harmful or hazardous drinking.  A score of 13 or more in women, and 15 or more in men, is likely to indicate alcohol dependence. Substance Abuse Interventions:  · Tobacco abuse:  tobacco cessation tips and resources provided    General Health and ACP:  General  In general, how would you say your health is?: Very Good  In the past 7 days, have you experienced any of the following?  New or Increased Pain, New or Increased Fatigue, Loneliness, Social Isolation, Stress or Anger?: None of These  Do you get the social and emotional support that you need?: Yes  Do you have a Living Will?: Yes  Advance Directives     Power of  Living Will ACP-Advance Directive ACP-Power of     Not on File Not on File Not on File Not on File      General Health Risk Interventions:  · No Living Will: ACP documents already completed- patient asked to provide copy to the office     Hearing/Vision:  No exam data present  Hearing/Vision  Do you or your family notice any trouble with your hearing that hasn't been managed with hearing aids?: No  Do you have difficulty driving, watching TV, or doing any of your daily activities because of your eyesight?: No  Have you had an eye exam within the past year?: (!) No  Hearing/Vision Interventions:  · Vision concerns:  patient encouraged to make appointment with his/her eye specialist      Personalized Preventive Plan   Current Health Maintenance Status  Immunization History   Administered Date(s) Administered    Influenza Virus Vaccine 10/13/2017    Influenza, Quadv, IM, PF (6 mo and older Fluzone, Flulaval, Fluarix, and 3 yrs and older Afluria) 11/15/2015, 10/13/2017    Pneumococcal Conjugate 13-valent ([facility]) 12/12/2015    Pneumococcal Polysaccharide ([id number]) 11/12/2016    Tdap (Boostrix, Adacel) 12/12/2015, 04/10/2017    Zoster Live (Zostavax) 10/31/2015        Health Maintenance   Topic Date Due    COVID-19 Vaccine (1) Never done    Low dose CT lung screening  Never done    DEXA (modify frequency per FRAX score)  Never done    Shingles Vaccine (2 of 3) 12/26/2015    Annual Wellness Visit (AWV)  Never done    Breast cancer screen  08/02/2019    Flu vaccine (1) 09/01/2021    Pneumococcal 65+ years Vaccine (2 of 2 - PPSV23) 11/12/2021    Lipid screen  07/20/2025    DTaP/Tdap/Td vaccine (3 - Td or Tdap) 04/10/2027    Colon cancer screen colonoscopy  08/14/2030    Hepatitis C screen  Completed    Hepatitis A vaccine  Aged Out    Hepatitis B vaccine  Aged Out    Hib vaccine  Aged Out    Meningococcal (ACWY) vaccine  Aged Out     Recommendations for [employer] Due: see orders and patient instructions/AVS.  . Recommended screening schedule for the next 5-10 years is provided to the patient in written form: see Patient Instructions/AVS.    There are no diagnoses linked to this encounter.

## 2021-07-22 LAB
CHOLESTEROL/HDL RATIO: 2.6
CHOLESTEROL: 200 MG/DL
HDLC SERPL-MCNC: 76 MG/DL
LDL CHOLESTEROL: 107 MG/DL (ref 0–130)
TRIGL SERPL-MCNC: 84 MG/DL
VLDLC SERPL CALC-MCNC: ABNORMAL MG/DL (ref 1–30)

## 2021-08-18 ENCOUNTER — OFFICE VISIT (OUTPATIENT)
Dept: FAMILY MEDICINE CLINIC | Age: 66
End: 2021-08-18
Payer: MEDICARE

## 2021-08-18 ENCOUNTER — HOSPITAL ENCOUNTER (OUTPATIENT)
Age: 66
Setting detail: SPECIMEN
Discharge: HOME | End: 2021-08-18
Payer: MEDICARE

## 2021-08-18 VITALS
OXYGEN SATURATION: 95 % | HEART RATE: 66 BPM | WEIGHT: 129 LBS | DIASTOLIC BLOOD PRESSURE: 70 MMHG | BODY MASS INDEX: 25.19 KG/M2 | SYSTOLIC BLOOD PRESSURE: 120 MMHG

## 2021-08-18 DIAGNOSIS — D48.5 NEOPLASM OF UNCERTAIN BEHAVIOR OF SKIN OF LOWER EXTREMITY: Primary | ICD-10-CM

## 2021-08-18 DIAGNOSIS — Z12.31 VISIT FOR SCREENING MAMMOGRAM: ICD-10-CM

## 2021-08-18 PROCEDURE — 11102 TANGNTL BX SKIN SINGLE LES: CPT | Performed by: FAMILY MEDICINE

## 2021-08-18 PROCEDURE — 88342 IMHCHEM/IMCYTCHM 1ST ANTB: CPT

## 2021-08-18 PROCEDURE — 88341 IMHCHEM/IMCYTCHM EA ADD ANTB: CPT

## 2021-08-18 PROCEDURE — 88305 TISSUE EXAM BY PATHOLOGIST: CPT

## 2021-08-18 NOTE — PROGRESS NOTES
Patient presents for shave biopsy of skin lesion on the anterior right knee as discussed at last visit. This has been present for a couple years and is gradually gotten larger. Causes some irritation. Domed pink lesion on the right anterior knee near the inferior most point of the patella, 8 x 10 mm, very superficial ulceration at the apex. Written consent obtained for shave biopsy. Area injected with 2.5 mL 1% lidocaine with epinephrine. Prepped with Betadine and alcohol. Sterile gloves applied. Shave biopsy obtained with 1 mm margins on each side. Sent for pathology. Hyfrecator used for hemostasis. Bacitracin and bandage applied. Instructed in wound care.

## 2021-08-25 LAB — DERMATOLOGY PATHOLOGY REPORT: NORMAL

## 2021-08-31 DIAGNOSIS — M54.50 CHRONIC BILATERAL LOW BACK PAIN WITHOUT SCIATICA: ICD-10-CM

## 2021-08-31 DIAGNOSIS — G89.29 CHRONIC BILATERAL LOW BACK PAIN WITHOUT SCIATICA: ICD-10-CM

## 2021-08-31 RX ORDER — TRAMADOL HYDROCHLORIDE 50 MG/1
50 TABLET ORAL EVERY 6 HOURS PRN
Qty: 120 TABLET | Refills: 0 | Status: SHIPPED | OUTPATIENT
Start: 2021-08-31 | End: 2021-11-22 | Stop reason: SDUPTHER

## 2021-08-31 NOTE — TELEPHONE ENCOUNTER
Patient asking for a new script for Tramadol [facility]   Topic Date Due    COVID-19 Vaccine (1) Never done    Low dose CT lung screening  Never done    DEXA (modify frequency per FRAX score)  Never done    Shingles Vaccine (2 of 3) 12/26/2015    Breast cancer screen  08/02/2019    Flu vaccine (1) 09/01/2021    Pneumococcal 65+ years Vaccine (2 of 2 - PPSV23) 11/12/2021    Annual Wellness Visit (AWV)  07/22/2022    Lipid screen  07/21/2026    DTaP/Tdap/Td vaccine (3 - Td or Tdap) 04/10/2027    Colon cancer screen colonoscopy  08/14/2030    Hepatitis C screen  Completed    Hepatitis A vaccine  Aged Out    Hepatitis B vaccine  Aged Out    Hib vaccine  Aged Out    Meningococcal (ACWY) vaccine  Aged Out             (applicable per patient's age: Cancer Screenings, Depression Screening, Fall Risk Screening, Immunizations)    LDL Cholesterol (mg/dL)   Date Value   07/21/2021 107     AST (U/L)   Date Value   07/21/2021 23     ALT (U/L)   Date Value   07/21/2021 10     BUN (mg/dL)   Date Value   07/21/2021 12      (goal A1C is < 7)   (goal LDL is <100) need 30-50% reduction from baseline     BP Readings from Last 3 Encounters:   08/18/21 120/70   07/21/21 112/70   07/20/20 126/80    (goal /80)      All Future Testing planned in CarePATH:  Lab Frequency Next Occurrence   IRVING RADHA DIGITAL SCREEN BILATERAL Once 08/18/2021       Next Visit Date:  No future appointments.          Patient Active Problem List:     Dysthymia     Arthritis     Amaurosis fugax     History of laser assisted in situ keratomileusis     Panlobular emphysema ([address]

## 2021-10-11 ENCOUNTER — TELEPHONE (OUTPATIENT)
Dept: FAMILY MEDICINE CLINIC | Age: 66
End: 2021-10-11

## 2021-10-11 RX ORDER — OXYBUTYNIN CHLORIDE 10 MG/1
10 TABLET, EXTENDED RELEASE ORAL DAILY
Qty: 90 TABLET | Refills: 2 | Status: SHIPPED | OUTPATIENT
Start: 2021-10-11 | End: 2022-01-18 | Stop reason: SDUPTHER

## 2021-10-11 NOTE — TELEPHONE ENCOUNTER
Patient is asking for a refill on Amlodipine, Pantoprazole and Oxybutynin. Patient last seen 8/18/21. No future appt found. [facility] Maintenance   Topic Date Due    COVID-19 Vaccine (1) Never done    Low dose CT lung screening  Never done    DEXA (modify frequency per FRAX score)  Never done    Shingles Vaccine (2 of 3) 12/26/2015    Breast cancer screen  08/02/2019    Flu vaccine (1) 09/01/2021    Pneumococcal 65+ years Vaccine (2 of 2 - PPSV23) 11/12/2021    Annual Wellness Visit (AWV)  07/22/2022    Lipid screen  07/21/2026    DTaP/Tdap/Td vaccine (3 - Td or Tdap) 04/10/2027    Colon cancer screen colonoscopy  08/14/2030    Hepatitis C screen  Completed    Hepatitis A vaccine  Aged Out    Hepatitis B vaccine  Aged Out    Hib vaccine  Aged Out    Meningococcal (ACWY) vaccine  Aged Out             (applicable per patient's age: Cancer Screenings, Depression Screening, Fall Risk Screening, Immunizations)    LDL Cholesterol (mg/dL)   Date Value   07/21/2021 107     AST (U/L)   Date Value   07/21/2021 23     ALT (U/L)   Date Value   07/21/2021 10     BUN (mg/dL)   Date Value   07/21/2021 12      (goal A1C is < 7)   (goal LDL is <100) need 30-50% reduction from baseline     BP Readings from Last 3 Encounters:   08/18/21 120/70   07/21/21 112/70   07/20/20 126/80    (goal /80)      All Future Testing planned in CarePATH:  Lab Frequency Next Occurrence   IRVING RADHA DIGITAL SCREEN BILATERAL Once 10/02/2021       Next Visit Date:  No future appointments.          Patient Active Problem List:     Dysthymia     Arthritis     Amaurosis fugax     History of laser assisted in situ keratomileusis     Panlobular emphysema ([address]

## 2021-11-22 DIAGNOSIS — G89.29 CHRONIC BILATERAL LOW BACK PAIN WITHOUT SCIATICA: ICD-10-CM

## 2021-11-22 DIAGNOSIS — M54.50 CHRONIC BILATERAL LOW BACK PAIN WITHOUT SCIATICA: ICD-10-CM

## 2021-11-22 RX ORDER — TRAMADOL HYDROCHLORIDE 50 MG/1
50 TABLET ORAL EVERY 6 HOURS PRN
Qty: 120 TABLET | Refills: 2 | Status: SHIPPED | OUTPATIENT
Start: 2021-11-22 | End: 2022-02-08 | Stop reason: SDUPTHER

## 2022-01-18 ENCOUNTER — TELEPHONE (OUTPATIENT)
Dept: FAMILY MEDICINE CLINIC | Age: 67
End: 2022-01-18

## 2022-01-18 RX ORDER — OXYBUTYNIN CHLORIDE 10 MG/1
10 TABLET, EXTENDED RELEASE ORAL DAILY
Qty: 90 TABLET | Refills: 0 | Status: SHIPPED | OUTPATIENT
Start: 2022-01-18 | End: 2022-04-25 | Stop reason: SDUPTHER

## 2022-01-18 RX ORDER — PANTOPRAZOLE SODIUM 20 MG/1
TABLET, DELAYED RELEASE ORAL
Qty: 90 TABLET | Refills: 0 | Status: SHIPPED | OUTPATIENT
Start: 2022-01-18 | End: 2022-05-18 | Stop reason: SDUPTHER

## 2022-01-18 NOTE — TELEPHONE ENCOUNTER
Patient is asking for refill on Pantoprazole 20 mg and Oxybutynin 10 mg. [facility] in [address] Maintenance   Topic Date Due    COVID-19 Vaccine (1) Never done    Low dose CT lung screening  Never done    DEXA (modify frequency per FRAX score)  Never done    Shingles Vaccine (2 of 3) 12/26/2015    Breast cancer screen  08/02/2019    Flu vaccine (1) 09/01/2021    Pneumococcal 65+ years Vaccine (2 of 2 - PPSV23) 11/12/2021    Depression Monitoring  07/21/2022    Annual Wellness Visit (AWV)  07/22/2022    Lipid screen  07/21/2026    DTaP/Tdap/Td vaccine (3 - Td or Tdap) 04/10/2027    Colon cancer screen colonoscopy  08/14/2030    Hepatitis C screen  Completed    Hepatitis A vaccine  Aged Out    Hepatitis B vaccine  Aged Out    Hib vaccine  Aged Out    Meningococcal (ACWY) vaccine  Aged Out             (applicable per patient's age: Cancer Screenings, Depression Screening, Fall Risk Screening, Immunizations)    LDL Cholesterol (mg/dL)   Date Value   07/21/2021 107     AST (U/L)   Date Value   07/21/2021 23     ALT (U/L)   Date Value   07/21/2021 10     BUN (mg/dL)   Date Value   07/21/2021 12      (goal A1C is < 7)   (goal LDL is <100) need 30-50% reduction from baseline     BP Readings from Last 3 Encounters:   08/18/21 120/70   07/21/21 112/70   07/20/20 126/80    (goal /80)      All Future Testing planned in [facility]:  Lab Frequency Next Occurrence   IRVING RADHA DIGITAL SCREEN BILATERAL Once 10/17/2022       Next Visit Date:  No future appointments.          Patient Active Problem List:     Dysthymia     Arthritis     Amaurosis fugax     History of laser assisted in situ keratomileusis     Panlobular emphysema ([address]

## 2022-02-08 DIAGNOSIS — M54.50 CHRONIC BILATERAL LOW BACK PAIN WITHOUT SCIATICA: ICD-10-CM

## 2022-02-08 DIAGNOSIS — G89.29 CHRONIC BILATERAL LOW BACK PAIN WITHOUT SCIATICA: ICD-10-CM

## 2022-02-08 RX ORDER — TRAMADOL HYDROCHLORIDE 50 MG/1
50 TABLET ORAL EVERY 6 HOURS PRN
Qty: 120 TABLET | Refills: 2 | Status: SHIPPED | OUTPATIENT
Start: 2022-02-08 | End: 2022-04-25

## 2022-02-08 NOTE — TELEPHONE ENCOUNTER
Last visit:  8/18/2021  Next Visit Date:  No future appointments. Medication List:  Prior to Admission medications    Medication Sig Start Date End Date Taking? Authorizing Provider   oxybutynin (DITROPAN XL) 10 MG extended release tablet Take 1 tablet by mouth daily 1/18/22   Navid Ates, DO   pantoprazole (PROTONIX) 20 MG tablet Take 1 tablet by mouth  daily 1/18/22   Navid Ates, DO   traMADol (ULTRAM) 50 MG tablet Take 1 tablet by mouth every 6 hours as needed for Pain for up to 90 days. 11/22/21 2/20/22  Navid Ates, DO   amLODIPine (NORVASC) 5 MG tablet 1 by mouth every day 7/21/21   Navid Ates, DO   albuterol sulfate HFA (PROAIR HFA) 108 (90 Base) MCG/ACT inhaler Inhale 2 puffs into the lungs every 6 hours as needed for Wheezing 9/17/18   IVANNA Escobar CNP   docusate sodium (COLACE) 100 MG capsule Take 1 capsule by mouth daily as needed for Constipation.  11/26/14   Jose F Dolan MD

## 2022-02-08 NOTE — TELEPHONE ENCOUNTER
Patient is in [place] and would like a new script for Tramadol sent to [facility] Maintenance   Topic Date Due    COVID-19 Vaccine (1) Never done    Low dose CT lung screening  Never done    DEXA (modify frequency per FRAX score)  Never done    Shingles Vaccine (2 of 3) 12/26/2015    Breast cancer screen  08/02/2019    Flu vaccine (1) 09/01/2021    Pneumococcal 65+ years Vaccine (2 of 2 - PPSV23) 11/12/2021    Depression Monitoring  07/21/2022    Annual Wellness Visit (AWV)  07/22/2022    Lipid screen  07/21/2026    DTaP/Tdap/Td vaccine (3 - Td or Tdap) 04/10/2027    Colon cancer screen colonoscopy  08/14/2030    Hepatitis C screen  Completed    Hepatitis A vaccine  Aged Out    Hepatitis B vaccine  Aged Out    Hib vaccine  Aged Out    Meningococcal (ACWY) vaccine  Aged Out             (applicable per patient's age: Cancer Screenings, Depression Screening, Fall Risk Screening, Immunizations)    LDL Cholesterol (mg/dL)   Date Value   07/21/2021 107     AST (U/L)   Date Value   07/21/2021 23     ALT (U/L)   Date Value   07/21/2021 10     BUN (mg/dL)   Date Value   07/21/2021 12      (goal A1C is < 7)   (goal LDL is <100) need 30-50% reduction from baseline     BP Readings from Last 3 Encounters:   08/18/21 120/70   07/21/21 112/70   07/20/20 126/80    (goal /80)      All Future Testing planned in [facility]:  Lab Frequency Next Occurrence   IRVING RADHA DIGITAL SCREEN BILATERAL Once 10/17/2022       Next Visit Date:  No future appointments.          Patient Active Problem List:     Dysthymia     Arthritis     Amaurosis fugax     History of laser assisted in situ keratomileusis     Panlobular emphysema ([address]

## 2022-04-25 DIAGNOSIS — G89.29 CHRONIC BILATERAL LOW BACK PAIN WITHOUT SCIATICA: ICD-10-CM

## 2022-04-25 DIAGNOSIS — M54.50 CHRONIC BILATERAL LOW BACK PAIN WITHOUT SCIATICA: ICD-10-CM

## 2022-04-25 RX ORDER — OXYBUTYNIN CHLORIDE 10 MG/1
10 TABLET, EXTENDED RELEASE ORAL DAILY
Qty: 90 TABLET | Refills: 1 | Status: SHIPPED | OUTPATIENT
Start: 2022-04-25 | End: 2022-10-11 | Stop reason: SDUPTHER

## 2022-04-25 RX ORDER — TRAMADOL HYDROCHLORIDE 50 MG/1
TABLET ORAL
Qty: 120 TABLET | Refills: 0 | Status: SHIPPED | OUTPATIENT
Start: 2022-04-25 | End: 2022-05-25

## 2022-04-25 RX ORDER — OXYBUTYNIN CHLORIDE 10 MG/1
10 TABLET, EXTENDED RELEASE ORAL DAILY
Qty: 90 TABLET | Refills: 1 | Status: SHIPPED | OUTPATIENT
Start: 2022-04-25 | End: 2022-04-25 | Stop reason: SDUPTHER

## 2022-04-25 NOTE — TELEPHONE ENCOUNTER
Patient called back in and needed the [facility]rop[facility].  She no longer can send rx[facility] to [facility]  Will you resend to [facility]

## 2022-04-25 NOTE — TELEPHONE ENCOUNTER
----- Message from Karla Cuello sent at 4/23/2022  9:15 AM EDT -----  Subject: Refill Request    QUESTIONS  Name of Medication? oxybutynin (DITROPAN XL) 10 MG extended release tablet  Patient-reported dosage and instructions? 10 mg   How many days do you have left? 0  Preferred Pharmacy? [address] [facility] [place] [address] [facility] [address]  Pharmacy phone number (if available)? [phone]  ---------------------------------------------------------------------------  --------------  CALL BACK INFO  What is the best way for the office to contact you? Do not leave any   message, patient will call back for answer  Preferred Call Back Phone Number? [phone]  ---------------------------------------------------------------------------  --------------  SCRIPT ANSWERS  Relationship to Patient?  Self

## 2022-04-25 NOTE — TELEPHONE ENCOUNTER
Last OV: 8/18/2021  Last RX:    Next scheduled apt: Visit date not found        Pt requesting a refill

## 2022-04-25 NOTE — TELEPHONE ENCOUNTER
Patient called and she would like tramadol sent to [facility]  Not able to send her rx's to [facility] any longer.

## 2022-04-25 NOTE — TELEPHONE ENCOUNTER
----- Message from Simona Rodriguez sent at 4/23/2022  9:18 AM EDT -----  Subject: Medication Problem    QUESTIONS  Name of Medication? traMADol (ULTRAM) 50 MG tablet  Patient-reported dosage and instructions? 50 mg   What question or problem do you have with the medication? Patient wants to   know why her prescription is only for 30 days and not 90 days. She would   like a 90 day prescription. Patient would like to speak to someone about   this. Preferred Pharmacy? [address] [facility] [address], [address] [facility]  Pharmacy phone number (if available)? [phone]  Additional Information for Provider?   ---------------------------------------------------------------------------  --------------  [address]  What is the best way for the office to contact you? Do not leave any   message, patient will call back for answer  Preferred Call Back Phone Number? [phone]  ---------------------------------------------------------------------------  --------------  SCRIPT ANSWERS  Relationship to Patient?  Self

## 2022-04-25 NOTE — TELEPHONE ENCOUNTER
Last OV: 8/18/2021 Neoplasm of skin 07/21/21 AWV  Last RX:    Next scheduled apt: Visit date not found      PT REQUESTING A REFILL

## 2022-04-26 NOTE — TELEPHONE ENCOUNTER
Attempted to call patient at all 3 numbers on her chart  Unable to leave a message that she needs office visit.    Only 30 day supply of tramadol sent to pharmacy

## 2022-05-18 ENCOUNTER — OFFICE VISIT (OUTPATIENT)
Dept: FAMILY MEDICINE CLINIC | Age: 67
End: 2022-05-18
Payer: MEDICARE

## 2022-05-18 VITALS
SYSTOLIC BLOOD PRESSURE: 122 MMHG | HEIGHT: 60 IN | OXYGEN SATURATION: 98 % | HEART RATE: 66 BPM | DIASTOLIC BLOOD PRESSURE: 82 MMHG | WEIGHT: 136 LBS | BODY MASS INDEX: 26.7 KG/M2

## 2022-05-18 DIAGNOSIS — I73.9 CLAUDICATION OF BOTH LOWER EXTREMITIES (HCC): Primary | ICD-10-CM

## 2022-05-18 DIAGNOSIS — I10 ESSENTIAL HYPERTENSION: ICD-10-CM

## 2022-05-18 DIAGNOSIS — I70.213 ATHEROSCLEROSIS OF NATIVE ARTERIES OF EXTREMITIES WITH INTERMITTENT CLAUDICATION, BILATERAL LEGS (HCC): ICD-10-CM

## 2022-05-18 DIAGNOSIS — J43.1 PANLOBULAR EMPHYSEMA (HCC): ICD-10-CM

## 2022-05-18 PROCEDURE — 3023F SPIROM DOC REV: CPT | Performed by: FAMILY MEDICINE

## 2022-05-18 PROCEDURE — 4004F PT TOBACCO SCREEN RCVD TLK: CPT | Performed by: FAMILY MEDICINE

## 2022-05-18 PROCEDURE — 99214 OFFICE O/P EST MOD 30 MIN: CPT | Performed by: FAMILY MEDICINE

## 2022-05-18 PROCEDURE — 1123F ACP DISCUSS/DSCN MKR DOCD: CPT | Performed by: FAMILY MEDICINE

## 2022-05-18 PROCEDURE — G8417 CALC BMI ABV UP PARAM F/U: HCPCS | Performed by: FAMILY MEDICINE

## 2022-05-18 PROCEDURE — 1090F PRES/ABSN URINE INCON ASSESS: CPT | Performed by: FAMILY MEDICINE

## 2022-05-18 PROCEDURE — G8427 DOCREV CUR MEDS BY ELIG CLIN: HCPCS | Performed by: FAMILY MEDICINE

## 2022-05-18 PROCEDURE — G8400 PT W/DXA NO RESULTS DOC: HCPCS | Performed by: FAMILY MEDICINE

## 2022-05-18 PROCEDURE — 3017F COLORECTAL CA SCREEN DOC REV: CPT | Performed by: FAMILY MEDICINE

## 2022-05-18 RX ORDER — CYCLOBENZAPRINE HCL 10 MG
10 TABLET ORAL 3 TIMES DAILY PRN
Qty: 90 TABLET | Refills: 0 | Status: SHIPPED | OUTPATIENT
Start: 2022-05-18 | End: 2022-10-05 | Stop reason: SDUPTHER

## 2022-05-18 RX ORDER — PANTOPRAZOLE SODIUM 20 MG/1
TABLET, DELAYED RELEASE ORAL
Qty: 90 TABLET | Refills: 3 | Status: SHIPPED | OUTPATIENT
Start: 2022-05-18

## 2022-05-18 RX ORDER — ASPIRIN 81 MG/1
81 TABLET ORAL DAILY
Qty: 30 TABLET | Refills: 2 | Status: SHIPPED | OUTPATIENT
Start: 2022-05-18

## 2022-05-18 ASSESSMENT — PATIENT HEALTH QUESTIONNAIRE - PHQ9
SUM OF ALL RESPONSES TO PHQ QUESTIONS 1-9: 0
5. POOR APPETITE OR OVEREATING: 0
9. THOUGHTS THAT YOU WOULD BE BETTER OFF DEAD, OR OF HURTING YOURSELF: 0
8. MOVING OR SPEAKING SO SLOWLY THAT OTHER PEOPLE COULD HAVE NOTICED. OR THE OPPOSITE, BEING SO FIGETY OR RESTLESS THAT YOU HAVE BEEN MOVING AROUND A LOT MORE THAN USUAL: 0
2. FEELING DOWN, DEPRESSED OR HOPELESS: 0
SUM OF ALL RESPONSES TO PHQ9 QUESTIONS 1 & 2: 0
10. IF YOU CHECKED OFF ANY PROBLEMS, HOW DIFFICULT HAVE THESE PROBLEMS MADE IT FOR YOU TO DO YOUR WORK, TAKE CARE OF THINGS AT HOME, OR GET ALONG WITH OTHER PEOPLE: 0
7. TROUBLE CONCENTRATING ON THINGS, SUCH AS READING THE NEWSPAPER OR WATCHING TELEVISION: 0
SUM OF ALL RESPONSES TO PHQ QUESTIONS 1-9: 0
3. TROUBLE FALLING OR STAYING ASLEEP: 0
SUM OF ALL RESPONSES TO PHQ QUESTIONS 1-9: 0
4. FEELING TIRED OR HAVING LITTLE ENERGY: 0
SUM OF ALL RESPONSES TO PHQ QUESTIONS 1-9: 0
1. LITTLE INTEREST OR PLEASURE IN DOING THINGS: 0
6. FEELING BAD ABOUT YOURSELF - OR THAT YOU ARE A FAILURE OR HAVE LET YOURSELF OR YOUR FAMILY DOWN: 0

## 2022-05-18 NOTE — PATIENT INSTRUCTIONS
SURVEY:    You may be receiving a survey from [employer] regarding your visit today. You may get this in the mail, through your MyChart or in your email. Please complete the survey to enable us to provide the highest quality of care to you and your family. If you cannot score us as very good ( 5 Stars) on any question, please feel free to call the office to discuss how we could have made your experience exceptional.     Thank you.     Clinical Care Team:  Dr. Neyda Woo, DO Ranjit Ford, [address] [place] [address] Team:  [address] [place] [address]

## 2022-05-18 NOTE — PROGRESS NOTES
Name: Cesia West  : 1955         Chief Complaint:     Chief Complaint   Patient presents with    Knee Pain     flexeril refill?  Numbness     feet    COPD       History of Present Illness:      Cesia West is a 75 y.o.  female who presents with Knee Pain (flexeril refill?), Numbness (feet), and COPD      HPI    C/o numbness that can happen in either foot overnight but happens more so in L foot. Burning pain R 4th and 5th toes. Gets pain and numbness with walking up into legs. Bad enough that she can barely even walk through stores anymore. Decided last wk to take old flexeril pills and it seems like they may help some. L knee tends to be unstable, had collapsed a couple yrs ago and she fell. No recent falls. Hypertension, taking Norvasc as directed    Breathing stable, continues albuterol as needed. Still smoking. Medical History:     Patient Active Problem List   Diagnosis    Dysthymia    Arthritis    Amaurosis fugax    History of laser assisted in situ keratomileusis    [address]lobular emphysema ([facility] [address] [facility]    Essential hypertension       Medications:       Prior to Admission medications    Medication Sig Start Date End Date Taking?  Authorizing Provider   pantoprazole (PROTONIX) 20 MG tablet Take 1 tablet by mouth  daily 22  Yes [place], DO   cyclobenzaprine (FLEXERIL) 10 MG tablet Take 1 tablet by mouth 3 times daily as needed for Muscle spasms 22  Yes [place], DO   aspirin EC 81 MG EC tablet Take 1 tablet by mouth daily 22  Yes [place], DO   traMADol (ULTRAM) 50 MG tablet TAKE 1 TABLET BY MOUTH EVERY 6 HOURS AS NEEDED FOR PAIN 22 Yes [place], DO   oxybutynin (DITROPAN XL) 10 MG extended release tablet Take 1 tablet by mouth daily 22  Yes [place], DO   amLODIPine (NORVASC) 5 MG tablet 1 by mouth every day 21  Yes Bhargavi Valverde, DO   albuterol sulfate HFA (PROAIR HFA) 108 (90 Base) MCG/ACT inhaler Inhale 2 puffs into the lungs every 6 hours as needed for Wheezing 9/17/18  Yes Noah Puentes, APRN - CNP   docusate sodium (COLACE) 100 MG capsule Take 1 capsule by mouth daily as needed for Constipation. 11/26/14  Yes Daniel Sharma MD        Allergies:       Patient has no known allergies. Physical Exam:     Vitals:  /82   Pulse 66   Ht 5' (1.524 m)   Wt 136 lb (61.7 kg)   LMP 11/25/2003 (Approximate)   SpO2 98%   BMI 26.56 kg/m²   Physical Exam  Vitals and nursing note reviewed. Constitutional:       Appearance: Normal appearance. She is well-developed. She is not ill-appearing. HENT:      Right Ear: Hearing and tympanic membrane normal.      Left Ear: Hearing and tympanic membrane normal.      Nose: Nose normal. No mucosal edema. Mouth/Throat:      Mouth: Mucous membranes are moist.      Pharynx: Oropharynx is clear. Eyes:      Pupils: Pupils are equal, round, and reactive to light. Neck:      Thyroid: No thyroid mass or thyromegaly. Cardiovascular:      Rate and Rhythm: Normal rate and regular rhythm. Heart sounds: S1 normal and S2 normal. No murmur heard. Comments: R foot DP pulse 1+. L foot DP pulse barely palpable. L toes cool to touch and 5th toe has purple discoloration, delayed cap refill. No ulceration on either foot. No edema. samara femoral pulses 2+  Pulmonary:      Effort: Pulmonary effort is normal.      Breath sounds: Normal breath sounds. Abdominal:      General: Bowel sounds are normal.      Palpations: Abdomen is soft. Tenderness: There is no abdominal tenderness. Lymphadenopathy:      Cervical: No cervical adenopathy. Skin:     General: Skin is warm and dry. Findings: No rash. Neurological:      Mental Status: She is alert and oriented to person, place, and time.    Psychiatric:         Mood and Affect: Mood normal.         Behavior: Behavior normal.         Data:     Lab Results   Component Value Date     07/21/2021 K 4.4 07/21/2021    CL 98 07/21/2021    CO2 29 07/21/2021    BUN 14 05/23/2022    CREATININE 0.61 05/23/2022    GLUCOSE 85 07/21/2021    PROT 7.4 07/21/2021    LABALBU 4.4 07/21/2021    BILITOT 0.46 07/21/2021    ALKPHOS 141 07/21/2021    AST 23 07/21/2021    ALT 10 07/21/2021     Lab Results   Component Value Date    WBC 7.3 06/11/2019    RBC 4.78 06/11/2019    HGB 15.3 06/11/2019    HCT 44.6 06/11/2019    MCV 93.2 06/11/2019    MCH 32.0 06/11/2019    MCHC 34.3 06/11/2019    RDW 14.1 06/11/2019     06/11/2019    MPV NOT REPORTED 06/11/2019     Lab Results   Component Value Date    TSH 1.71 07/20/2020     Lab Results   Component Value Date    CHOL 200 07/21/2021    HDL 76 07/21/2021         Assessment & Plan:        Diagnosis Orders   1. Claudication of both lower extremities (HCC)  VL LOWER EXTREMITY ARTERIAL SEGMENTAL PRESSURES W PPG   2. Panlobular emphysema ([address]     3. Essential hypertension     4. Atherosclerosis of native arteries of extremities with intermittent claudication, bilateral legs (HCC)   CTA ABDOMINAL AORTA W BILAT RUNOFF W CONTRAST     1. Lower extremity symptoms highly suspicious for PAD. ABIs ordered. Continue baby aspirin and we will likely need to add statin also. 2.  COPD stable, continue albuterol as needed and working on smoking cessation  3. Hypertension controlled, continue current Rx      4. Day after visit received NISHA results, ratio of 0.5 in left lower extremity. Referred to vascular. CT ordered.         Requested Prescriptions     Signed Prescriptions Disp Refills    pantoprazole (PROTONIX) 20 MG tablet 90 tablet 3     Sig: Take 1 tablet by mouth  daily    cyclobenzaprine (FLEXERIL) 10 MG tablet 90 tablet 0     Sig: Take 1 tablet by mouth 3 times daily as needed for Muscle spasms    aspirin EC 81 MG EC tablet 30 tablet 2     Sig: Take 1 tablet by mouth daily         Patient Instructions   SURVEY:    You may be receiving a survey from [employer] regarding your visit today.    Nelson Geniedarren may get this in the mail, through your MyChart or in your email. Please complete the survey to enable us to provide the highest quality of care to you and your family. If you cannot score us as very good ( 5 Stars) on any question, please feel free to call the office to discuss how we could have made your experience exceptional.     Thank you.     Clinical Care Team:  Dr. Nima Scanlon DO                                           [facility], [address] [place] [address] Team:  Alfreda Burns          signed by Nima Scanlon DO on 5/23/2022 at 4:44 PM  [address]  [address]  Dept: [phone]

## 2022-05-19 ENCOUNTER — TELEPHONE (OUTPATIENT)
Dept: FAMILY MEDICINE CLINIC | Age: 67
End: 2022-05-19

## 2022-05-19 ENCOUNTER — HOSPITAL ENCOUNTER (OUTPATIENT)
Dept: VASCULAR LAB | Age: 67
Discharge: HOME | End: 2022-05-21
Payer: MEDICARE

## 2022-05-19 DIAGNOSIS — I73.9 PVD (PERIPHERAL VASCULAR DISEASE) WITH CLAUDICATION (HCC): Primary | ICD-10-CM

## 2022-05-19 DIAGNOSIS — I73.9 CLAUDICATION OF BOTH LOWER EXTREMITIES (HCC): ICD-10-CM

## 2022-05-19 PROCEDURE — 93923 UPR/LXTR ART STDY 3+ LVLS: CPT

## 2022-05-19 NOTE — TELEPHONE ENCOUNTER
Patient notified and verbalized understanding.   Please order referral, scheduling will call her now to schedule

## 2022-05-19 NOTE — TELEPHONE ENCOUNTER
2 attempts made to contact patient. vm full       [facility]ham Milling also goes to [facility].       [address] Vascular  L[address]  [place], [address] [facility]

## 2022-05-19 NOTE — TELEPHONE ENCOUNTER
----- Message from Narciso Kayser, DO sent at 5/19/2022  3:34 PM EDT -----  Does have narrowing in blood vessels in both legs, much worse on the left, which goes along with her symptoms. I will order a CT she needs to have done and she'll need to see a vascular surgeon. There may be one in [place]; otherwise can refer to [facility] in [place].

## 2022-05-23 ENCOUNTER — HOSPITAL ENCOUNTER (OUTPATIENT)
Dept: CT IMAGING | Age: 67
Discharge: HOME | End: 2022-05-25
Payer: MEDICARE

## 2022-05-23 DIAGNOSIS — I73.9 PVD (PERIPHERAL VASCULAR DISEASE) (HCC): ICD-10-CM

## 2022-05-23 DIAGNOSIS — I73.9 PVD (PERIPHERAL VASCULAR DISEASE) WITH CLAUDICATION (HCC): ICD-10-CM

## 2022-05-23 DIAGNOSIS — I70.213 ATHEROSCLEROSIS OF NATIVE ARTERIES OF EXTREMITIES WITH INTERMITTENT CLAUDICATION, BILATERAL LEGS (HCC): ICD-10-CM

## 2022-05-23 DIAGNOSIS — I73.9 PVD (PERIPHERAL VASCULAR DISEASE) (HCC): Primary | ICD-10-CM

## 2022-05-23 PROBLEM — I10 ESSENTIAL HYPERTENSION: Status: ACTIVE | Noted: 2022-05-23

## 2022-05-23 LAB
BUN BLDV-MCNC: 14 MG/DL (ref 8–23)
CREAT SERPL-MCNC: 0.61 MG/DL (ref 0.5–0.9)
GFR AFRICAN AMERICAN: >60 ML/MIN
GFR NON-AFRICAN AMERICAN: >60 ML/MIN
GFR SERPL CREATININE-BSD FRML MDRD: NORMAL ML/MIN/{1.73_M2}

## 2022-05-23 PROCEDURE — 76376 3D RENDER W/INTRP POSTPROCES: CPT

## 2022-05-23 PROCEDURE — 6360000004 HC RX CONTRAST MEDICATION: Performed by: FAMILY MEDICINE

## 2022-05-23 PROCEDURE — 36415 COLL VENOUS BLD VENIPUNCTURE: CPT

## 2022-05-23 PROCEDURE — 75635 CT ANGIO ABDOMINAL ARTERIES: CPT

## 2022-05-23 PROCEDURE — 84520 ASSAY OF UREA NITROGEN: CPT

## 2022-05-23 PROCEDURE — 82565 ASSAY OF CREATININE: CPT

## 2022-05-23 RX ADMIN — IOPAMIDOL 100 ML: 755 INJECTION, SOLUTION INTRAVENOUS at 13:33

## 2022-06-06 ENCOUNTER — TELEPHONE (OUTPATIENT)
Dept: FAMILY MEDICINE CLINIC | Age: 67
End: 2022-06-06

## 2022-06-06 RX ORDER — BUPROPION HYDROCHLORIDE 100 MG/1
100 TABLET, EXTENDED RELEASE ORAL 2 TIMES DAILY
Qty: 60 TABLET | Refills: 5 | Status: SHIPPED | OUTPATIENT
Start: 2022-06-06 | End: 2022-10-05 | Stop reason: SDUPTHER

## 2022-09-15 RX ORDER — AMLODIPINE BESYLATE 5 MG/1
TABLET ORAL
Qty: 90 TABLET | Refills: 3 | Status: SHIPPED | OUTPATIENT
Start: 2022-09-15

## 2022-09-15 NOTE — TELEPHONE ENCOUNTER
Last visit:  5/18/2022  Next Visit Date:  No future appointments. Medication List:  Prior to Admission medications    Medication Sig Start Date End Date Taking? Authorizing Provider   buPROPion [facility] SR) 100 MG extended release tablet Take 1 tablet by mouth 2 times daily Choose a quit date 7-30 days after starting med. 6/6/22   [facility], DO   pantoprazole (PROTONIX) 20 MG tablet Take 1 tablet by mouth  daily 5/18/22   [facility], DO   cyclobenzaprine (FLEXERIL) 10 MG tablet Take 1 tablet by mouth 3 times daily as needed for Muscle spasms 5/18/22   [facility], DO   aspirin EC 81 MG EC tablet Take 1 tablet by mouth daily 5/18/22   [facility], DO   oxybutynin (DITROPAN XL) 10 MG extended release tablet Take 1 tablet by mouth daily 4/25/22   [facility], DO   amLODIPine (NORVASC) 5 MG tablet 1 by mouth every day 7/21/21   [facility], DO   albuterol sulfate HFA (PROAIR HFA) 108 (90 Base) MCG/ACT inhaler Inhale 2 puffs into the lungs every 6 hours as needed for Wheezing 9/17/18   Miranda Mcghee, APRN - CNP   docusate sodium (COLACE) 100 MG capsule Take 1 capsule by mouth daily as needed for Constipation.  11/26/14   Sandi Friend MD

## 2022-10-05 DIAGNOSIS — G89.29 CHRONIC BILATERAL LOW BACK PAIN WITHOUT SCIATICA: ICD-10-CM

## 2022-10-05 DIAGNOSIS — M54.50 CHRONIC BILATERAL LOW BACK PAIN WITHOUT SCIATICA: ICD-10-CM

## 2022-10-05 RX ORDER — TRAMADOL HYDROCHLORIDE 50 MG/1
50 TABLET ORAL EVERY 6 HOURS PRN
Qty: 120 TABLET | Refills: 2 | Status: SHIPPED | OUTPATIENT
Start: 2022-10-05 | End: 2023-01-03

## 2022-10-05 RX ORDER — BUPROPION HYDROCHLORIDE 100 MG/1
100 TABLET, EXTENDED RELEASE ORAL 2 TIMES DAILY
Qty: 60 TABLET | Refills: 5 | Status: SHIPPED | OUTPATIENT
Start: 2022-10-05

## 2022-10-05 RX ORDER — CYCLOBENZAPRINE HCL 10 MG
10 TABLET ORAL 3 TIMES DAILY PRN
Qty: 90 TABLET | Refills: 0 | Status: SHIPPED | OUTPATIENT
Start: 2022-10-05

## 2022-10-05 NOTE — TELEPHONE ENCOUNTER
Last visit:  5/18/2022  Next Visit Date:    Future Appointments   Date Time Provider Robert Amosi   10/11/2022  3:40 PM DO Tri [facility]         Medication List:  Prior to Admission medications    Medication Sig Start Date End Date Taking? Authorizing Provider   amLODIPine (NORVASC) 5 MG tablet 1 by mouth every day 9/15/22   Mj Veliz, DO   buPROPion [facility] SR) 100 MG extended release tablet Take 1 tablet by mouth 2 times daily Choose a quit date 7-30 days after starting med. 6/6/22   Mj Veliz, DO   pantoprazole (PROTONIX) 20 MG tablet Take 1 tablet by mouth  daily 5/18/22   Mj Veliz, DO   cyclobenzaprine (FLEXERIL) 10 MG tablet Take 1 tablet by mouth 3 times daily as needed for Muscle spasms 5/18/22   Mj Veliz, DO   aspirin EC 81 MG EC tablet Take 1 tablet by mouth daily 5/18/22   Mj Veliz, DO   oxybutynin (DITROPAN XL) 10 MG extended release tablet Take 1 tablet by mouth daily 4/25/22   Mj Veliz, DO   albuterol sulfate HFA (PROAIR HFA) 108 (90 Base) MCG/ACT inhaler Inhale 2 puffs into the lungs every 6 hours as needed for Wheezing 9/17/18   IVANNA Stoddard CNP   docusate sodium (COLACE) 100 MG capsule Take 1 capsule by mouth daily as needed for Constipation.  11/26/14   Stanton Krishnamurthy MD

## 2022-10-11 ENCOUNTER — OFFICE VISIT (OUTPATIENT)
Dept: FAMILY MEDICINE CLINIC | Age: 67
End: 2022-10-11
Payer: MEDICARE

## 2022-10-11 VITALS
BODY MASS INDEX: 25.68 KG/M2 | DIASTOLIC BLOOD PRESSURE: 80 MMHG | SYSTOLIC BLOOD PRESSURE: 124 MMHG | HEART RATE: 95 BPM | WEIGHT: 136 LBS | OXYGEN SATURATION: 96 % | HEIGHT: 61 IN

## 2022-10-11 DIAGNOSIS — Z00.00 MEDICARE ANNUAL WELLNESS VISIT, SUBSEQUENT: Primary | ICD-10-CM

## 2022-10-11 DIAGNOSIS — I70.212 ATHEROSCLEROSIS OF NATIVE ARTERY OF LEFT LOWER EXTREMITY WITH INTERMITTENT CLAUDICATION (HCC): ICD-10-CM

## 2022-10-11 DIAGNOSIS — R05.2 SUBACUTE COUGH: ICD-10-CM

## 2022-10-11 DIAGNOSIS — Z12.31 VISIT FOR SCREENING MAMMOGRAM: ICD-10-CM

## 2022-10-11 DIAGNOSIS — L01.00 IMPETIGO: ICD-10-CM

## 2022-10-11 PROCEDURE — G8400 PT W/DXA NO RESULTS DOC: HCPCS | Performed by: FAMILY MEDICINE

## 2022-10-11 PROCEDURE — 1123F ACP DISCUSS/DSCN MKR DOCD: CPT | Performed by: FAMILY MEDICINE

## 2022-10-11 PROCEDURE — G8484 FLU IMMUNIZE NO ADMIN: HCPCS | Performed by: FAMILY MEDICINE

## 2022-10-11 PROCEDURE — G8427 DOCREV CUR MEDS BY ELIG CLIN: HCPCS | Performed by: FAMILY MEDICINE

## 2022-10-11 PROCEDURE — G0439 PPPS, SUBSEQ VISIT: HCPCS | Performed by: FAMILY MEDICINE

## 2022-10-11 PROCEDURE — 1036F TOBACCO NON-USER: CPT | Performed by: FAMILY MEDICINE

## 2022-10-11 PROCEDURE — 99214 OFFICE O/P EST MOD 30 MIN: CPT | Performed by: FAMILY MEDICINE

## 2022-10-11 PROCEDURE — G8417 CALC BMI ABV UP PARAM F/U: HCPCS | Performed by: FAMILY MEDICINE

## 2022-10-11 PROCEDURE — 1090F PRES/ABSN URINE INCON ASSESS: CPT | Performed by: FAMILY MEDICINE

## 2022-10-11 PROCEDURE — 3017F COLORECTAL CA SCREEN DOC REV: CPT | Performed by: FAMILY MEDICINE

## 2022-10-11 RX ORDER — CILOSTAZOL 100 MG/1
TABLET ORAL
COMMUNITY
Start: 2022-09-17 | End: 2022-10-11 | Stop reason: SDUPTHER

## 2022-10-11 RX ORDER — CEPHALEXIN 500 MG/1
500 CAPSULE ORAL 3 TIMES DAILY
Qty: 21 CAPSULE | Refills: 0 | Status: SHIPPED | OUTPATIENT
Start: 2022-10-11 | End: 2022-10-18

## 2022-10-11 RX ORDER — ATORVASTATIN CALCIUM 40 MG/1
TABLET, FILM COATED ORAL
COMMUNITY
Start: 2022-08-23 | End: 2022-10-17 | Stop reason: SDUPTHER

## 2022-10-11 RX ORDER — OXYBUTYNIN CHLORIDE 10 MG/1
10 TABLET, EXTENDED RELEASE ORAL DAILY
Qty: 90 TABLET | Refills: 1 | Status: SHIPPED | OUTPATIENT
Start: 2022-10-11

## 2022-10-11 RX ORDER — ALBUTEROL SULFATE 90 UG/1
2 AEROSOL, METERED RESPIRATORY (INHALATION) EVERY 6 HOURS PRN
Qty: 1 EACH | Refills: 3 | Status: SHIPPED | OUTPATIENT
Start: 2022-10-11

## 2022-10-11 RX ORDER — CILOSTAZOL 100 MG/1
100 TABLET ORAL 2 TIMES DAILY
Qty: 180 TABLET | Refills: 1 | Status: SHIPPED | OUTPATIENT
Start: 2022-10-11

## 2022-10-11 SDOH — ECONOMIC STABILITY: FOOD INSECURITY: WITHIN THE PAST 12 MONTHS, YOU WORRIED THAT YOUR FOOD WOULD RUN OUT BEFORE YOU GOT MONEY TO BUY MORE.: NEVER TRUE

## 2022-10-11 SDOH — ECONOMIC STABILITY: FOOD INSECURITY: WITHIN THE PAST 12 MONTHS, THE FOOD YOU BOUGHT JUST DIDN'T LAST AND YOU DIDN'T HAVE MONEY TO GET MORE.: NEVER TRUE

## 2022-10-11 SDOH — HEALTH STABILITY: PHYSICAL HEALTH: ON AVERAGE, HOW MANY DAYS PER WEEK DO YOU ENGAGE IN MODERATE TO STRENUOUS EXERCISE (LIKE A BRISK WALK)?: 0 DAYS

## 2022-10-11 ASSESSMENT — PATIENT HEALTH QUESTIONNAIRE - PHQ9
10. IF YOU CHECKED OFF ANY PROBLEMS, HOW DIFFICULT HAVE THESE PROBLEMS MADE IT FOR YOU TO DO YOUR WORK, TAKE CARE OF THINGS AT HOME, OR GET ALONG WITH OTHER PEOPLE: 0
7. TROUBLE CONCENTRATING ON THINGS, SUCH AS READING THE NEWSPAPER OR WATCHING TELEVISION: 0
6. FEELING BAD ABOUT YOURSELF - OR THAT YOU ARE A FAILURE OR HAVE LET YOURSELF OR YOUR FAMILY DOWN: 0
1. LITTLE INTEREST OR PLEASURE IN DOING THINGS: 0
5. POOR APPETITE OR OVEREATING: 0
SUM OF ALL RESPONSES TO PHQ QUESTIONS 1-9: 0
2. FEELING DOWN, DEPRESSED OR HOPELESS: 0
4. FEELING TIRED OR HAVING LITTLE ENERGY: 0
SUM OF ALL RESPONSES TO PHQ QUESTIONS 1-9: 0
9. THOUGHTS THAT YOU WOULD BE BETTER OFF DEAD, OR OF HURTING YOURSELF: 0
3. TROUBLE FALLING OR STAYING ASLEEP: 0
8. MOVING OR SPEAKING SO SLOWLY THAT OTHER PEOPLE COULD HAVE NOTICED. OR THE OPPOSITE, BEING SO FIGETY OR RESTLESS THAT YOU HAVE BEEN MOVING AROUND A LOT MORE THAN USUAL: 0
SUM OF ALL RESPONSES TO PHQ9 QUESTIONS 1 & 2: 0
SUM OF ALL RESPONSES TO PHQ QUESTIONS 1-9: 0
SUM OF ALL RESPONSES TO PHQ QUESTIONS 1-9: 0

## 2022-10-11 ASSESSMENT — LIFESTYLE VARIABLES
HOW OFTEN DURING THE LAST YEAR HAVE YOU HAD A FEELING OF GUILT OR REMORSE AFTER DRINKING: NEVER
HOW OFTEN DURING THE LAST YEAR HAVE YOU NEEDED AN ALCOHOLIC DRINK FIRST THING IN THE MORNING TO GET YOURSELF GOING AFTER A NIGHT OF HEAVY DRINKING: NEVER
HOW OFTEN DURING THE LAST YEAR HAVE YOU HAD A FEELING OF GUILT OR REMORSE AFTER DRINKING: 0
HOW OFTEN DO YOU HAVE SIX OR MORE DRINKS ON ONE OCCASION: 2
HAS A RELATIVE, FRIEND, DOCTOR, OR ANOTHER HEALTH PROFESSIONAL EXPRESSED CONCERN ABOUT YOUR DRINKING OR SUGGESTED YOU CUT DOWN: 0
HOW OFTEN DURING THE LAST YEAR HAVE YOU FAILED TO DO WHAT WAS NORMALLY EXPECTED FROM YOU BECAUSE OF DRINKING: NEVER
HOW OFTEN DURING THE LAST YEAR HAVE YOU BEEN UNABLE TO REMEMBER WHAT HAPPENED THE NIGHT BEFORE BECAUSE YOU HAD BEEN DRINKING: NEVER
HAVE YOU OR SOMEONE ELSE BEEN INJURED AS A RESULT OF YOUR DRINKING: NO
HOW OFTEN DURING THE LAST YEAR HAVE YOU FAILED TO DO WHAT WAS NORMALLY EXPECTED FROM YOU BECAUSE OF DRINKING: 0
HAS A RELATIVE, FRIEND, DOCTOR, OR ANOTHER HEALTH PROFESSIONAL EXPRESSED CONCERN ABOUT YOUR DRINKING OR SUGGESTED YOU CUT DOWN: NO
HOW MANY STANDARD DRINKS CONTAINING ALCOHOL DO YOU HAVE ON A TYPICAL DAY: 1 OR 2
HAVE YOU OR SOMEONE ELSE BEEN INJURED AS A RESULT OF YOUR DRINKING: 0
HOW OFTEN DO YOU HAVE A DRINK CONTAINING ALCOHOL: MONTHLY OR LESS
HOW OFTEN DURING THE LAST YEAR HAVE YOU FOUND THAT YOU WERE NOT ABLE TO STOP DRINKING ONCE YOU HAD STARTED: NEVER
HOW OFTEN DO YOU HAVE A DRINK CONTAINING ALCOHOL: 2
HOW MANY STANDARD DRINKS CONTAINING ALCOHOL DO YOU HAVE ON A TYPICAL DAY: 1
HOW OFTEN DURING THE LAST YEAR HAVE YOU NEEDED AN ALCOHOLIC DRINK FIRST THING IN THE MORNING TO GET YOURSELF GOING AFTER A NIGHT OF HEAVY DRINKING: 0
HOW OFTEN DURING THE LAST YEAR HAVE YOU FOUND THAT YOU WERE NOT ABLE TO STOP DRINKING ONCE YOU HAD STARTED: 0
HOW OFTEN DURING THE LAST YEAR HAVE YOU BEEN UNABLE TO REMEMBER WHAT HAPPENED THE NIGHT BEFORE BECAUSE YOU HAD BEEN DRINKING: 0

## 2022-10-11 ASSESSMENT — SOCIAL DETERMINANTS OF HEALTH (SDOH): HOW HARD IS IT FOR YOU TO PAY FOR THE VERY BASICS LIKE FOOD, HOUSING, MEDICAL CARE, AND HEATING?: NOT HARD AT ALL

## 2022-10-11 NOTE — PROGRESS NOTES
Name: Vitaly López  : 1955         Chief Complaint:     Chief Complaint   Patient presents with    Cough     Stopped smoking 1 month ago, cough started shortly after stopping. Medicare AWV       History of Present Illness:      Vitaly López is a 75 y.o.  female who presents with Cough (Stopped smoking 1 month ago, cough started shortly after stopping. ) and Medicare AWV      HPI    C/o cough for past month or so, seemed like it started when she quit smoking. Feels like there's mucus that can get to the center of her chest but can never bring it up. Has been through a whole bottle of cough medicine. Can cough so hard she'll be incontinent of urine or stool. No fever and has been feeling okay otherwise. No acute illness at onset of cough. Does also have some redness and pain at left tip of nose which has been present for a couple days, very sore. No discharge. Recent diagnosis of peripheral vascular disease, taking cilostazol which has been helpful, able to walk further without pain. She does have follow-up with vascular. Medical History:     Patient Active Problem List   Diagnosis    Dysthymia    Arthritis    Amaurosis fugax    History of laser assisted in situ keratomileusis    Panlobular emphysema ([address]    Essential hypertension    Atherosclerosis of native artery of left lower extremity with intermittent claudication (HCC)       Medications:       Prior to Admission medications    Medication Sig Start Date End Date Taking?  Authorizing Provider   oxybutynin (DITROPAN XL) 10 MG extended release tablet Take 1 tablet by mouth daily 10/11/22  Yes Leona Lantigua, DO   albuterol sulfate HFA (PROAIR HFA) 108 (90 Base) MCG/ACT inhaler Inhale 2 puffs into the lungs every 6 hours as needed (cough) 10/11/22  Yes Leona Lantigua, DO   cephALEXin (KEFLEX) 500 MG capsule Take 1 capsule by mouth 3 times daily for 7 days 10/11/22 10/18/22 Yes Leona Lantigua, DO   cilostazol (PLETAL) 100 MG tablet Take 1 tablet by mouth 2 times daily 10/11/22  Yes Solitarioyne Gram, DO   atorvastatin (LIPITOR) 40 MG tablet TAKE 1 TABLET BY MOUTH ONCE DAILY 8/23/22   Historical Provider, MD   cyclobenzaprine (FLEXERIL) 10 MG tablet Take 1 tablet by mouth 3 times daily as needed for Muscle spasms 10/5/22   Kathlyne Gram, DO   buPROPion [facility] SR) 100 MG extended release tablet Take 1 tablet by mouth 2 times daily 10/5/22   Kathlyne Gram, DO   traMADol (ULTRAM) 50 MG tablet Take 1 tablet by mouth every 6 hours as needed for Pain for up to 90 days. 10/5/22 1/3/23  Kathlyne Gram, DO   amLODIPine (NORVASC) 5 MG tablet 1 by mouth every day 9/15/22   Kathlyne Gram, DO   pantoprazole (PROTONIX) 20 MG tablet Take 1 tablet by mouth  daily 5/18/22   Kathlyne Gram, DO   aspirin EC 81 MG EC tablet Take 1 tablet by mouth daily 5/18/22   Kathlyne Gram, DO   docusate sodium (COLACE) 100 MG capsule Take 1 capsule by mouth daily as needed for Constipation. 11/26/14   Bucky Cortez MD        Allergies:       Patient has no known allergies. Physical Exam:     Vitals:  /80   Pulse 95   Ht 5' 1\" (1.549 m)   Wt 136 lb (61.7 kg)   LMP 11/25/2003 (Approximate)   SpO2 96%   BMI 25.70 kg/m²   Physical Exam  Vitals and nursing note reviewed. Constitutional:       General: She is not in acute distress. Appearance: She is well-developed. HENT:      Head: Normocephalic and atraumatic. Right Ear: Tympanic membrane and ear canal normal.      Left Ear: Tympanic membrane and ear canal normal.      Nose:      Comments: Tip of nose and slightly into the left medial naris: Erythema with mild swelling and tenderness     Mouth/Throat:      Mouth: Mucous membranes are moist.      Pharynx: Oropharynx is clear. Eyes:      Conjunctiva/sclera: Conjunctivae normal.   Cardiovascular:      Rate and Rhythm: Normal rate and regular rhythm. Heart sounds: Normal heart sounds.    Pulmonary:      Effort: Pulmonary effort is normal.      Breath sounds: Normal breath sounds. Musculoskeletal:      Cervical back: Neck supple. Lymphadenopathy:      Cervical: No cervical adenopathy. Skin:     General: Skin is warm and dry. Neurological:      Mental Status: She is alert and oriented to person, place, and time. Psychiatric:         Mood and Affect: Mood normal.         Behavior: Behavior normal.       Data:     Lab Results   Component Value Date/Time     07/21/2021 02:17 PM    K 4.4 07/21/2021 02:17 PM    CL 98 07/21/2021 02:17 PM    CO2 29 07/21/2021 02:17 PM    BUN 14 05/23/2022 12:12 PM    CREATININE 0.61 05/23/2022 12:12 PM    GLUCOSE 85 07/21/2021 02:17 PM    PROT 7.4 07/21/2021 02:17 PM    LABALBU 4.4 07/21/2021 02:17 PM    BILITOT 0.46 07/21/2021 02:17 PM    ALKPHOS 141 07/21/2021 02:17 PM    AST 23 07/21/2021 02:17 PM    ALT 10 07/21/2021 02:17 PM     Lab Results   Component Value Date/Time    WBC 7.3 06/11/2019 04:58 PM    RBC 4.78 06/11/2019 04:58 PM    HGB 15.3 06/11/2019 04:58 PM    HCT 44.6 06/11/2019 04:58 PM    MCV 93.2 06/11/2019 04:58 PM    MCH 32.0 06/11/2019 04:58 PM    MCHC 34.3 06/11/2019 04:58 PM    RDW 14.1 06/11/2019 04:58 PM     06/11/2019 04:58 PM    MPV NOT REPORTED 06/11/2019 04:58 PM     Lab Results   Component Value Date/Time    TSH 1.71 07/20/2020 04:12 PM     Lab Results   Component Value Date/Time    CHOL 200 07/21/2021 02:17 PM    HDL 76 07/21/2021 02:17 PM         Assessment & Plan:        Diagnosis Orders   1. Medicare annual wellness visit, subsequent        2. Atherosclerosis of native artery of left lower extremity with intermittent claudication (HCC)        3. Subacute cough        4. Impetigo        5. Visit for screening mammogram  [facility] RADHA DIGITAL SCREEN BILATERAL        2. Peripheral vascular disease with symptom improvement on Pletal.  Continue same and continue following with vascular. Excellent that she did stop smoking.   3.  Cough for the past few weeks likely related to having quit smoking, airways mobilizing secretions. Albuterol prescribed to use as needed. 4.  External nasal infection likely impetigo, quite painful and given location will treat with oral antibiotic. Follow-up if not beginning to improve in the next couple days. Requested Prescriptions     Signed Prescriptions Disp Refills    oxybutynin (DITROPAN XL) 10 MG extended release tablet 90 tablet 1     Sig: Take 1 tablet by mouth daily    albuterol sulfate HFA (PROAIR HFA) 108 (90 Base) MCG/ACT inhaler 1 each 3     Sig: Inhale 2 puffs into the lungs every 6 hours as needed (cough)    cephALEXin (KEFLEX) 500 MG capsule 21 capsule 0     Sig: Take 1 capsule by mouth 3 times daily for 7 days    cilostazol (PLETAL) 100 MG tablet 180 tablet 1     Sig: Take 1 tablet by mouth 2 times daily         Patient Instructions   Personalized Preventive Plan for Julieta De Jesus - 10/11/2022  Medicare offers a range of preventive health benefits. Some of the tests and screenings are paid in full while other may be subject to a deductible, co-insurance, and/or copay. Some of these benefits include a comprehensive review of your medical history including lifestyle, illnesses that may run in your family, and various assessments and screenings as appropriate. After reviewing your medical record and screening and assessments performed today your provider may have ordered immunizations, labs, imaging, and/or referrals for you. A list of these orders (if applicable) as well as your Preventive Care list are included within your After Visit Summary for your review. Other Preventive Recommendations:    A preventive eye exam performed by an eye specialist is recommended every 1-2 years to screen for glaucoma; cataracts, macular degeneration, and other eye disorders. A preventive dental visit is recommended every 6 months.   Try to get at least 150 minutes of exercise per week or 10,000 steps per day on a pedometer . Order or download the FREE \"Exercise & Physical Activity: Your Everyday Guide\" from [employer]. Call 1-487.185.1059 or search [employer] online. You need 0232-5752 mg of calcium and 8899-4240 IU of vitamin D per day. It is possible to meet your calcium requirement with diet alone, but a vitamin D supplement is usually necessary to meet this goal.  When exposed to the sun, use a sunscreen that protects against both UVA and UVB radiation with an SPF of 30 or greater. Reapply every 2 to 3 hours or after sweating, drying off with a towel, or swimming. Always wear a seat belt when traveling in a car.  Always wear a helmet when riding a bicycle or motorcycle.       signed by Jo Rice DO on 10/16/2022 at 3:54 PM  [facility]  [address] [place], [address]  Dept: [phone]

## 2022-10-11 NOTE — PATIENT INSTRUCTIONS
Personalized Preventive Plan for [facility] Shepherd - 10/11/2022  Medicare offers a range of preventive health benefits. Some of the tests and screenings are paid in full while other may be subject to a deductible, co-insurance, and/or copay. Some of these benefits include a comprehensive review of your medical history including lifestyle, illnesses that may run in your family, and various assessments and screenings as appropriate. After reviewing your medical record and screening and assessments performed today your provider may have ordered immunizations, labs, imaging, and/or referrals for you. A list of these orders (if applicable) as well as your Preventive Care list are included within your After Visit Summary for your review. Other Preventive Recommendations:    A preventive eye exam performed by an eye specialist is recommended every 1-2 years to screen for glaucoma; cataracts, macular degeneration, and other eye disorders. A preventive dental visit is recommended every 6 months. Try to get at least 150 minutes of exercise per week or 10,000 steps per day on a pedometer . Order or download the FREE \"Exercise & Physical Activity: Your Everyday Guide\" from [employer]. Call 1-693.853.3325 or search [employer] online. You need 4286-8883 mg of calcium and 5349-7024 IU of vitamin D per day. It is possible to meet your calcium requirement with diet alone, but a vitamin D supplement is usually necessary to meet this goal.  When exposed to the sun, use a sunscreen that protects against both UVA and UVB radiation with an SPF of 30 or greater. Reapply every 2 to 3 hours or after sweating, drying off with a towel, or swimming. Always wear a seat belt when traveling in a car. Always wear a helmet when riding a bicycle or motorcycle.

## 2022-10-11 NOTE — PROGRESS NOTES
Medicare Annual Wellness Visit    [facility] is here for Cough (Stopped smoking 1 month ago, cough started shortly after stopping. ) and Medicare AWV    Assessment & Plan   Medicare annual wellness visit, subsequent  Visit for screening mammogram  -     [facility] RADHA DIGITAL SCREEN BILATERAL; Future    Recommendations for Preventive Services Due: see orders and patient instructions/AVS.  Recommended screening schedule for the next 5-10 years is provided to the patient in written form: see Patient Instructions/AVS.     Return for Medicare Annual Wellness Visit in 1 year. Subjective       Patient's complete Health Risk Assessment and screening values have been reviewed and are found in Flowsheets. The following problems were reviewed today and where indicated follow up appointments were made and/or referrals ordered. Positive Risk Factor Screenings with Interventions:             General Health and ACP:  General  In general, how would you say your health is?: Good  In the past 7 days, have you experienced any of the following: New or Increased Pain, New or Increased Fatigue, Loneliness, Social Isolation, Stress or Anger?: No  Do you get the social and emotional support that you need?: Yes  Do you have a Living Will?: (!) No    Advance Directives       Power of  Living Will ACP-Advance Directive ACP-Power of     Not on File Not on File Not on File Not on File        General Health Risk Interventions:  No Living Will: Patient declines ACP discussion/assistance              Objective   Vitals:    10/11/22 1549   BP: 124/80   Pulse: 95   SpO2: 96%   Weight: 136 lb (61.7 kg)   Height: 5' 1\" (1.549 m)      Body mass index is 25.7 kg/m². No Known Allergies  Prior to Visit Medications    Medication Sig Taking?  Authorizing Provider   oxybutynin (DITROPAN XL) 10 MG extended release tablet Take 1 tablet by mouth daily Yes Jo Rice,    albuterol sulfate HFA (PROAIR HFA) 108 (90 Base) MCG/ACT inhaler Inhale 2 puffs into the lungs every 6 hours as needed (cough) Yes Baby Fix, DO   cephALEXin (KEFLEX) 500 MG capsule Take 1 capsule by mouth 3 times daily for 7 days Yes Baby Fix, DO   cilostazol (PLETAL) 100 MG tablet Take 1 tablet by mouth 2 times daily Yes Baby Fix, DO   atorvastatin (LIPITOR) 40 MG tablet TAKE 1 TABLET BY MOUTH ONCE DAILY  Historical Provider, MD   cyclobenzaprine (FLEXERIL) 10 MG tablet Take 1 tablet by mouth 3 times daily as needed for Muscle spasms  Baby Fix, DO   buPROPion (WELLBUTRIN SR) 100 MG extended release tablet Take 1 tablet by mouth 2 times daily  Baby Fix, DO   traMADol (ULTRAM) 50 MG tablet Take 1 tablet by mouth every 6 hours as needed for Pain for up to 90 days. Baby Fix, DO   amLODIPine (NORVASC) 5 MG tablet 1 by mouth every day  Baby Fix, DO   pantoprazole (PROTONIX) 20 MG tablet Take 1 tablet by mouth  daily  Baby Fix, DO   aspirin EC 81 MG EC tablet Take 1 tablet by mouth daily  Baby Fix, DO   docusate sodium (COLACE) 100 MG capsule Take 1 capsule by mouth daily as needed for Constipation.   [facility] (Including outside providers/suppliers regularly involved in providing care):   Patient Care Team:  Baby Fix DO as PCP - General (Family Medicine)  Baby Fix, DO as PCP - REHABILITATION [facility] Empaneled Provider     Reviewed and updated this visit:  Tobacco  Allergies  Meds  Problems  Med Hx  Surg Hx  Soc Hx  Fam Hx

## 2022-10-16 PROBLEM — I70.212 ATHEROSCLEROSIS OF NATIVE ARTERY OF LEFT LOWER EXTREMITY WITH INTERMITTENT CLAUDICATION (HCC): Status: ACTIVE | Noted: 2022-09-01

## 2022-10-17 RX ORDER — ATORVASTATIN CALCIUM 40 MG/1
TABLET, FILM COATED ORAL
Qty: 90 TABLET | Refills: 1 | Status: SHIPPED | OUTPATIENT
Start: 2022-10-17

## 2022-10-17 NOTE — TELEPHONE ENCOUNTER
Last OV: 10/11/2022    Next scheduled apt: Visit date not found    Patient called in requesting 90 day refill of Atorvastatin since she will be in [place]

## 2022-10-17 NOTE — TELEPHONE ENCOUNTER
Does she want the Rx sent to [place] or local pharmacy? I didn't think they were leaving quite yet, and if I prescribe it in [place] now and they don't get there for a while, the pharm will just restock it.

## 2022-11-04 ENCOUNTER — TELEPHONE (OUTPATIENT)
Dept: FAMILY MEDICINE CLINIC | Age: 67
End: 2022-11-04

## 2022-11-04 NOTE — TELEPHONE ENCOUNTER
Patient is would like to know if Dr. Sachi Sorenson would write her a note for jury duty? Patient is currently in [place]. Patient last seen 10/11/22 for Medicare well visit. Please let patient know. Mail Letter to   [facility] Maintenance   Topic Date Due    Low dose CT lung screening  Never done    DEXA (modify frequency per FRAX score)  Never done    Shingles vaccine (2 of 3) 12/26/2015    COVID-19 Vaccine (3 - Booster for Moderna series) 06/07/2021    Pneumococcal 65+ years Vaccine (3 - PPSV23 if available, else PCV20) 11/12/2021    Breast cancer screen  02/24/2022    Lipids  07/21/2022    Flu vaccine (1) 08/01/2022    Depression Monitoring  10/11/2023    Annual Wellness Visit (AWV)  10/12/2023    DTaP/Tdap/Td vaccine (3 - Td or Tdap) 04/10/2027    Colorectal Cancer Screen  08/14/2030    Hepatitis C screen  Completed    Hepatitis A vaccine  Aged Out    Hib vaccine  Aged Out    Meningococcal (ACWY) vaccine  Aged Out             (applicable per patient's age: Cancer Screenings, Depression Screening, Fall Risk Screening, Immunizations)    LDL Cholesterol (mg/dL)   Date Value   07/21/2021 107     AST (U/L)   Date Value   07/21/2021 23     ALT (U/L)   Date Value   07/21/2021 10     BUN (mg/dL)   Date Value   05/23/2022 14      (goal A1C is < 7)   (goal LDL is <100) need 30-50% reduction from baseline     BP Readings from Last 3 Encounters:   10/11/22 124/80   05/18/22 122/82   08/18/21 120/70    (goal /80)      All Future Testing planned in [facility]:  Lab Frequency Next Occurrence   IRVING RADHA DIGITAL SCREEN BILATERAL Once 10/11/2022       Next Visit Date:  No future appointments.          Patient Active Problem List:     Dysthymia     Arthritis     Amaurosis fugax     History of laser assisted in situ keratomileusis     Panlobular emphysema ([address]     Essential hypertension     Atherosclerosis of native artery of left lower extremity with intermittent claudication ([facility]

## 2022-12-06 NOTE — TELEPHONE ENCOUNTER
Please see provider note for detailed assessment. Pt was up for discharge prior to being brought to a room    Tramadol 50 mg    [facility]   Topic Date Due    COVID-19 Vaccine (1) Never done    Low dose CT lung screening  Never done    DEXA (modify frequency per FRAX score)  Never done    Shingles Vaccine (2 of 3) 12/26/2015    Breast cancer screen  08/02/2019    Flu vaccine (1) 09/01/2021    Pneumococcal 65+ years Vaccine (2 of 2 - PPSV23) 11/12/2021    Annual Wellness Visit (AWV)  07/22/2022    Lipid screen  07/21/2026    DTaP/Tdap/Td vaccine (3 - Td or Tdap) 04/10/2027    Colon cancer screen colonoscopy  08/14/2030    Hepatitis C screen  Completed    Hepatitis A vaccine  Aged Out    Hepatitis B vaccine  Aged Out    Hib vaccine  Aged Out    Meningococcal (ACWY) vaccine  Aged Out             (applicable per patient's age: Cancer Screenings, Depression Screening, Fall Risk Screening, Immunizations)    LDL Cholesterol (mg/dL)   Date Value   07/21/2021 107     AST (U/L)   Date Value   07/21/2021 23     ALT (U/L)   Date Value   07/21/2021 10     BUN (mg/dL)   Date Value   07/21/2021 12      (goal A1C is < 7)   (goal LDL is <100) need 30-50% reduction from baseline     BP Readings from Last 3 Encounters:   08/18/21 120/70   07/21/21 112/70   07/20/20 126/80    (goal /80)      All Future Testing planned in [facility]:  Lab Frequency Next Occurrence   IRVING RADHA DIGITAL SCREEN BILATERAL Once 10/17/2022       Next Visit Date:  No future appointments.          Patient Active Problem List:     Dysthymia     Arthritis     Amaurosis fugax     History of laser assisted in situ keratomileusis     Panlobular emphysema ([facility]

## 2023-02-06 RX ORDER — CYCLOBENZAPRINE HCL 10 MG
10 TABLET ORAL 3 TIMES DAILY PRN
Qty: 90 TABLET | Refills: 0 | Status: SHIPPED | OUTPATIENT
Start: 2023-02-06

## 2023-02-06 NOTE — TELEPHONE ENCOUNTER
Patient is asking for a refill on Flexeril 10 mg. Patient last seen 10/11/22. [facility]  [address]    Health Maintenance   Topic Date Due    Low dose CT lung screening  Never done    DEXA (modify frequency per FRAX score)  Never done    Shingles vaccine (2 of 3) 12/26/2015    COVID-19 Vaccine (3 - Booster for Moderna series) 06/07/2021    Pneumococcal 65+ years Vaccine (3 - PPSV23 if available, else PCV20) 11/12/2021    Breast cancer screen  02/24/2022    Lipids  07/21/2022    Flu vaccine (1) 08/01/2022    Depression Monitoring  10/11/2023    Annual Wellness Visit (AWV)  10/12/2023    DTaP/Tdap/Td vaccine (3 - Td or Tdap) 04/10/2027    Colorectal Cancer Screen  08/14/2030    Hepatitis C screen  Completed    Hepatitis A vaccine  Aged Out    Hib vaccine  Aged Out    Meningococcal (ACWY) vaccine  Aged Out             (applicable per patient's age: Cancer Screenings, Depression Screening, Fall Risk Screening, Immunizations)    LDL Cholesterol (mg/dL)   Date Value   07/21/2021 107     AST (U/L)   Date Value   07/21/2021 23     ALT (U/L)   Date Value   07/21/2021 10     BUN (mg/dL)   Date Value   05/23/2022 14      (goal A1C is < 7)   (goal LDL is <100) need 30-50% reduction from baseline     BP Readings from Last 3 Encounters:   10/11/22 124/80   05/18/22 122/82   08/18/21 120/70    (goal /80)      All Future Testing planned in [facility]:  Lab Frequency Next Occurrence   IRVING RADHA DIGITAL SCREEN BILATERAL Once 10/11/2022       Next Visit Date:  No future appointments.          Patient Active Problem List:     Dysthymia     Arthritis     Amaurosis fugax     History of laser assisted in situ keratomileusis     Panlobular emphysema ([address]     Essential hypertension     Atherosclerosis of native artery of left lower extremity with intermittent claudication ([address]

## 2023-02-07 DIAGNOSIS — G89.29 CHRONIC BILATERAL LOW BACK PAIN WITHOUT SCIATICA: ICD-10-CM

## 2023-02-07 DIAGNOSIS — M54.50 CHRONIC BILATERAL LOW BACK PAIN WITHOUT SCIATICA: ICD-10-CM

## 2023-02-07 RX ORDER — TRAMADOL HYDROCHLORIDE 50 MG/1
50 TABLET ORAL EVERY 6 HOURS PRN
Qty: 120 TABLET | Refills: 2 | Status: SHIPPED | OUTPATIENT
Start: 2023-02-07 | End: 2023-05-08

## 2023-02-07 NOTE — TELEPHONE ENCOUNTER
Last OV: 10/11/2022 AWV    Next scheduled apt: Visit date not found      Patient requesting refill of Tramadol  Medication pending

## 2023-05-15 RX ORDER — OXYBUTYNIN CHLORIDE 10 MG/1
10 TABLET, EXTENDED RELEASE ORAL DAILY
Qty: 90 TABLET | Refills: 1 | Status: SHIPPED | OUTPATIENT
Start: 2023-05-15

## 2023-05-15 RX ORDER — BUPROPION HYDROCHLORIDE 100 MG/1
100 TABLET, EXTENDED RELEASE ORAL 2 TIMES DAILY
Qty: 60 TABLET | Refills: 5 | Status: SHIPPED | OUTPATIENT
Start: 2023-05-15

## 2023-05-15 NOTE — TELEPHONE ENCOUNTER
Oxybutynin 10 mg  Wellbutrin 100 mg      Brandon    AWV 10/11/22    Will call back when she is feeling better to schedule an appointment. Health Maintenance   Topic Date Due    Low dose CT lung screening  Never done    DEXA (modify frequency per FRAX score)  Never done    Shingles vaccine (2 of 3) 12/26/2015    Breast cancer screen  08/02/2019    COVID-19 Vaccine (3 - Booster for Moderna series) 06/07/2021    Pneumococcal 65+ years Vaccine (3 - PPSV23 if available, else PCV20) 11/12/2021    Lipids  07/21/2022    Flu vaccine (Season Ended) 08/01/2023    Depression Monitoring  10/11/2023    Annual Wellness Visit (AWV)  10/12/2023    DTaP/Tdap/Td vaccine (3 - Td or Tdap) 04/10/2027    Colorectal Cancer Screen  08/14/2030    Hepatitis C screen  Completed    Hepatitis A vaccine  Aged Out    Hib vaccine  Aged Out    Meningococcal (ACWY) vaccine  Aged Out             (applicable per patient's age: Cancer Screenings, Depression Screening, Fall Risk Screening, Immunizations)    LDL Cholesterol (mg/dL)   Date Value   07/21/2021 107     AST (U/L)   Date Value   07/21/2021 23     ALT (U/L)   Date Value   07/21/2021 10     BUN (mg/dL)   Date Value   05/23/2022 14      (goal A1C is < 7)   (goal LDL is <100) need 30-50% reduction from baseline     BP Readings from Last 3 Encounters:   10/11/22 124/80   05/18/22 122/82   08/18/21 120/70    (goal /80)      All Future Testing planned in [facility]:  Lab Frequency Next Occurrence   IRVING RADHA DIGITAL SCREEN BILATERAL Once 10/11/2022       Next Visit Date:  No future appointments.          Patient Active Problem List:     Dysthymia     Arthritis     Amaurosis fugax     History of laser assisted in situ keratomileusis     Panlobular emphysema ([address]     Essential hypertension     Atherosclerosis of native artery of left lower extremity with intermittent claudication ([address]

## 2023-08-23 ENCOUNTER — OFFICE VISIT (OUTPATIENT)
Dept: FAMILY MEDICINE CLINIC | Age: 68
End: 2023-08-23

## 2023-08-23 ENCOUNTER — HOSPITAL ENCOUNTER (OUTPATIENT)
Age: 68
Discharge: HOME | End: 2023-08-23
Payer: MEDICARE

## 2023-08-23 VITALS
SYSTOLIC BLOOD PRESSURE: 132 MMHG | DIASTOLIC BLOOD PRESSURE: 80 MMHG | BODY MASS INDEX: 26.31 KG/M2 | HEART RATE: 70 BPM | OXYGEN SATURATION: 95 % | HEIGHT: 60 IN | WEIGHT: 134 LBS

## 2023-08-23 DIAGNOSIS — I70.212 ATHEROSCLEROSIS OF NATIVE ARTERY OF LEFT LOWER EXTREMITY WITH INTERMITTENT CLAUDICATION (HCC): Primary | ICD-10-CM

## 2023-08-23 DIAGNOSIS — J43.1 PANLOBULAR EMPHYSEMA (HCC): ICD-10-CM

## 2023-08-23 DIAGNOSIS — I70.212 ATHEROSCLEROSIS OF NATIVE ARTERY OF LEFT LOWER EXTREMITY WITH INTERMITTENT CLAUDICATION (HCC): ICD-10-CM

## 2023-08-23 DIAGNOSIS — I73.9 PVD (PERIPHERAL VASCULAR DISEASE) WITH CLAUDICATION (HCC): ICD-10-CM

## 2023-08-23 DIAGNOSIS — I10 ESSENTIAL HYPERTENSION: ICD-10-CM

## 2023-08-23 LAB
ALBUMIN SERPL-MCNC: 4.2 G/DL (ref 3.5–5.2)
ALP SERPL-CCNC: 150 U/L (ref 35–104)
ALT SERPL-CCNC: 14 U/L (ref 5–33)
ANION GAP SERPL CALCULATED.3IONS-SCNC: 10 MMOL/L (ref 9–17)
AST SERPL-CCNC: 24 U/L
BILIRUB SERPL-MCNC: 0.5 MG/DL (ref 0.3–1.2)
BUN SERPL-MCNC: 14 MG/DL (ref 8–23)
BUN/CREAT SERPL: 20 (ref 9–20)
CALCIUM SERPL-MCNC: 9.7 MG/DL (ref 8.6–10.4)
CHLORIDE SERPL-SCNC: 97 MMOL/L (ref 98–107)
CHOLEST SERPL-MCNC: 138 MG/DL
CHOLESTEROL/HDL RATIO: 1.9
CO2 SERPL-SCNC: 28 MMOL/L (ref 20–31)
CREAT SERPL-MCNC: 0.7 MG/DL (ref 0.5–0.9)
GFR SERPL CREATININE-BSD FRML MDRD: >60 ML/MIN/1.73M2
GLUCOSE SERPL-MCNC: 92 MG/DL (ref 70–99)
HDLC SERPL-MCNC: 72 MG/DL
LDLC SERPL CALC-MCNC: 48 MG/DL (ref 0–130)
PATIENT FASTING?: YES
POTASSIUM SERPL-SCNC: 4.3 MMOL/L (ref 3.7–5.3)
PROT SERPL-MCNC: 7 G/DL (ref 6.4–8.3)
SODIUM SERPL-SCNC: 135 MMOL/L (ref 135–144)
TRIGL SERPL-MCNC: 89 MG/DL

## 2023-08-23 PROCEDURE — 36415 COLL VENOUS BLD VENIPUNCTURE: CPT

## 2023-08-23 PROCEDURE — 80053 COMPREHEN METABOLIC PANEL: CPT

## 2023-08-23 PROCEDURE — 80061 LIPID PANEL: CPT

## 2023-08-23 RX ORDER — CILOSTAZOL 100 MG/1
100 TABLET ORAL 2 TIMES DAILY
Qty: 180 TABLET | Refills: 1 | Status: SHIPPED | OUTPATIENT
Start: 2023-08-23

## 2023-08-23 RX ORDER — AMLODIPINE BESYLATE 5 MG/1
TABLET ORAL
Qty: 90 TABLET | Refills: 3 | Status: SHIPPED | OUTPATIENT
Start: 2023-08-23

## 2023-08-23 RX ORDER — PANTOPRAZOLE SODIUM 20 MG/1
TABLET, DELAYED RELEASE ORAL
Qty: 90 TABLET | Refills: 3 | Status: SHIPPED | OUTPATIENT
Start: 2023-08-23

## 2023-08-23 RX ORDER — CYCLOBENZAPRINE HCL 10 MG
10 TABLET ORAL 3 TIMES DAILY PRN
Qty: 90 TABLET | Refills: 0 | Status: SHIPPED | OUTPATIENT
Start: 2023-08-23

## 2023-08-23 SDOH — ECONOMIC STABILITY: FOOD INSECURITY: WITHIN THE PAST 12 MONTHS, YOU WORRIED THAT YOUR FOOD WOULD RUN OUT BEFORE YOU GOT MONEY TO BUY MORE.: NEVER TRUE

## 2023-08-23 SDOH — ECONOMIC STABILITY: HOUSING INSECURITY
IN THE LAST 12 MONTHS, WAS THERE A TIME WHEN YOU DID NOT HAVE A STEADY PLACE TO SLEEP OR SLEPT IN A SHELTER (INCLUDING NOW)?: NO

## 2023-08-23 SDOH — ECONOMIC STABILITY: INCOME INSECURITY: HOW HARD IS IT FOR YOU TO PAY FOR THE VERY BASICS LIKE FOOD, HOUSING, MEDICAL CARE, AND HEATING?: NOT HARD AT ALL

## 2023-08-23 SDOH — ECONOMIC STABILITY: FOOD INSECURITY: WITHIN THE PAST 12 MONTHS, THE FOOD YOU BOUGHT JUST DIDN'T LAST AND YOU DIDN'T HAVE MONEY TO GET MORE.: NEVER TRUE

## 2023-08-23 ASSESSMENT — PATIENT HEALTH QUESTIONNAIRE - PHQ9
3. TROUBLE FALLING OR STAYING ASLEEP: 0
8. MOVING OR SPEAKING SO SLOWLY THAT OTHER PEOPLE COULD HAVE NOTICED. OR THE OPPOSITE, BEING SO FIGETY OR RESTLESS THAT YOU HAVE BEEN MOVING AROUND A LOT MORE THAN USUAL: 0
SUM OF ALL RESPONSES TO PHQ QUESTIONS 1-9: 2
1. LITTLE INTEREST OR PLEASURE IN DOING THINGS: 0
SUM OF ALL RESPONSES TO PHQ QUESTIONS 1-9: 2
4. FEELING TIRED OR HAVING LITTLE ENERGY: 1
10. IF YOU CHECKED OFF ANY PROBLEMS, HOW DIFFICULT HAVE THESE PROBLEMS MADE IT FOR YOU TO DO YOUR WORK, TAKE CARE OF THINGS AT HOME, OR GET ALONG WITH OTHER PEOPLE: 0
9. THOUGHTS THAT YOU WOULD BE BETTER OFF DEAD, OR OF HURTING YOURSELF: 0
5. POOR APPETITE OR OVEREATING: 0
2. FEELING DOWN, DEPRESSED OR HOPELESS: 1
6. FEELING BAD ABOUT YOURSELF - OR THAT YOU ARE A FAILURE OR HAVE LET YOURSELF OR YOUR FAMILY DOWN: 0
SUM OF ALL RESPONSES TO PHQ QUESTIONS 1-9: 2
SUM OF ALL RESPONSES TO PHQ QUESTIONS 1-9: 2
7. TROUBLE CONCENTRATING ON THINGS, SUCH AS READING THE NEWSPAPER OR WATCHING TELEVISION: 0
SUM OF ALL RESPONSES TO PHQ9 QUESTIONS 1 & 2: 1

## 2023-08-23 ASSESSMENT — LIFESTYLE VARIABLES
HOW MANY STANDARD DRINKS CONTAINING ALCOHOL DO YOU HAVE ON A TYPICAL DAY: PATIENT DOES NOT DRINK
HOW OFTEN DO YOU HAVE A DRINK CONTAINING ALCOHOL: NEVER

## 2023-08-23 NOTE — PROGRESS NOTES
Name: Hemalatha Best  : 1955         Chief Complaint:     Chief Complaint   Patient presents with    Circulatory Problem     Artherosclerosis left lower extremity on [address] gap    COPD     Emphysema on [address] gap    Hypertension       History of Present Illness:      Hemalatha Best is a 70 y.o.  female who presents with Circulatory Problem (Artherosclerosis left lower extremity on [facility] gap), COPD (Emphysema on [address] gap), and Hypertension      HPI    F/u PVD. Does get some pain in LLE, mainly ankle, with walking. Continues pletal. Smoking but not a lot and doesn't really inhale. Bladder much better on ditropan. Some dry mouth. COPD stable, again still is smoking. No recent exacerbations. Albuterol prn. Medical History:     Patient Active Problem List   Diagnosis    Dysthymia    Amaurosis fugax    History of laser assisted in situ keratomileusis    Panlobular emphysema ([address])    Essential hypertension    Atherosclerosis of native artery of left lower extremity with intermittent claudication (HCC)       Medications:       Prior to Admission medications    Medication Sig Start Date End Date Taking?  Authorizing Provider   cilostazol (PLETAL) 100 MG tablet Take 1 tablet by mouth 2 times daily 23  Yes Darian Henry, DO   amLODIPine (NORVASC) 5 MG tablet 1 by mouth every day 23  Yes Darian Henry DO   pantoprazole (PROTONIX) 20 MG tablet Take 1 tablet by mouth  daily 23  Yes Darian Henry DO   cyclobenzaprine (FLEXERIL) 10 MG tablet Take 1 tablet by mouth 3 times daily as needed for Muscle spasms 23  Yes Darian Henry DO   atorvastatin (LIPITOR) 40 MG tablet TAKE 1 TABLET BY MOUTH ONCE DAILY 23  Yes Darian Henry DO   oxybutynin (DITROPAN XL) 10 MG extended release tablet Take 1 tablet by mouth daily 5/15/23  Yes Darian Henry DO   buPROPion [facility]) 100 MG extended release tablet Take 1 tablet by mouth 2 times daily 5/15/23  Yes Darian Henry, DO

## 2023-09-07 DIAGNOSIS — G89.29 CHRONIC BILATERAL LOW BACK PAIN WITHOUT SCIATICA: ICD-10-CM

## 2023-09-07 DIAGNOSIS — M54.50 CHRONIC BILATERAL LOW BACK PAIN WITHOUT SCIATICA: ICD-10-CM

## 2023-09-08 RX ORDER — TRAMADOL HYDROCHLORIDE 50 MG/1
50 TABLET ORAL EVERY 6 HOURS PRN
Qty: 120 TABLET | Refills: 2 | Status: SHIPPED | OUTPATIENT
Start: 2023-09-08 | End: 2023-12-07

## 2023-09-08 NOTE — TELEPHONE ENCOUNTER
Last visit:  8/23/2023  Next Visit Date:    Future Appointments   Date Time Provider [address]   10/16/2023  2:00 PM Barbie Green, DO [facility]         Medication List:  Prior to Admission medications    Medication Sig Start Date End Date Taking?  Authorizing Provider   cilostazol (PLETAL) 100 MG tablet Take 1 tablet by mouth 2 times daily 8/23/23   Barbie Green, DO   amLODIPine (NORVASC) 5 MG tablet 1 by mouth every day 8/23/23   Barbie Green, DO   pantoprazole (PROTONIX) 20 MG tablet Take 1 tablet by mouth  daily 8/23/23   Barbie Green, DO   cyclobenzaprine (FLEXERIL) 10 MG tablet Take 1 tablet by mouth 3 times daily as needed for Muscle spasms 8/23/23   Barbie Green, DO   atorvastatin (LIPITOR) 40 MG tablet TAKE 1 TABLET BY MOUTH ONCE DAILY 6/13/23   Barbie Green, DO   oxybutynin (DITROPAN XL) 10 MG extended release tablet Take 1 tablet by mouth daily 5/15/23   Barbie Green, DO   buPROPion [facility] SR) 100 MG extended release tablet Take 1 tablet by mouth 2 times daily 5/15/23   Barbie Green, DO   aspirin EC 81 MG EC tablet Take 1 tablet by mouth daily 5/18/22   Barbie Green, DO

## 2023-10-16 ENCOUNTER — OFFICE VISIT (OUTPATIENT)
Dept: FAMILY MEDICINE CLINIC | Age: 68
End: 2023-10-16
Payer: MEDICARE

## 2023-10-16 VITALS
SYSTOLIC BLOOD PRESSURE: 128 MMHG | BODY MASS INDEX: 26.5 KG/M2 | OXYGEN SATURATION: 96 % | HEART RATE: 86 BPM | WEIGHT: 135 LBS | DIASTOLIC BLOOD PRESSURE: 78 MMHG | HEIGHT: 60 IN

## 2023-10-16 DIAGNOSIS — Z87.891 PERSONAL HISTORY OF TOBACCO USE: ICD-10-CM

## 2023-10-16 DIAGNOSIS — Z00.00 MEDICARE ANNUAL WELLNESS VISIT, SUBSEQUENT: Primary | ICD-10-CM

## 2023-10-16 DIAGNOSIS — Z13.820 SCREENING FOR OSTEOPOROSIS: ICD-10-CM

## 2023-10-16 DIAGNOSIS — E28.39 OVARIAN FAILURE: ICD-10-CM

## 2023-10-16 DIAGNOSIS — Z12.31 VISIT FOR SCREENING MAMMOGRAM: ICD-10-CM

## 2023-10-16 PROCEDURE — 3074F SYST BP LT 130 MM HG: CPT | Performed by: FAMILY MEDICINE

## 2023-10-16 PROCEDURE — G0439 PPPS, SUBSEQ VISIT: HCPCS | Performed by: FAMILY MEDICINE

## 2023-10-16 PROCEDURE — 3078F DIAST BP <80 MM HG: CPT | Performed by: FAMILY MEDICINE

## 2023-10-16 PROCEDURE — 1123F ACP DISCUSS/DSCN MKR DOCD: CPT | Performed by: FAMILY MEDICINE

## 2023-10-16 PROCEDURE — G0296 VISIT TO DETERM LDCT ELIG: HCPCS | Performed by: FAMILY MEDICINE

## 2023-10-16 PROCEDURE — 3017F COLORECTAL CA SCREEN DOC REV: CPT | Performed by: FAMILY MEDICINE

## 2023-10-16 PROCEDURE — G8484 FLU IMMUNIZE NO ADMIN: HCPCS | Performed by: FAMILY MEDICINE

## 2023-10-16 RX ORDER — OXYBUTYNIN CHLORIDE 10 MG/1
10 TABLET, EXTENDED RELEASE ORAL DAILY
Qty: 90 TABLET | Refills: 1 | Status: SHIPPED | OUTPATIENT
Start: 2023-10-16

## 2023-10-16 RX ORDER — BUPROPION HYDROCHLORIDE 100 MG/1
100 TABLET, EXTENDED RELEASE ORAL 2 TIMES DAILY
Qty: 180 TABLET | Refills: 1 | Status: SHIPPED | OUTPATIENT
Start: 2023-10-16

## 2023-10-16 SDOH — ECONOMIC STABILITY: INCOME INSECURITY: HOW HARD IS IT FOR YOU TO PAY FOR THE VERY BASICS LIKE FOOD, HOUSING, MEDICAL CARE, AND HEATING?: NOT HARD AT ALL

## 2023-10-16 SDOH — ECONOMIC STABILITY: FOOD INSECURITY: WITHIN THE PAST 12 MONTHS, YOU WORRIED THAT YOUR FOOD WOULD RUN OUT BEFORE YOU GOT MONEY TO BUY MORE.: NEVER TRUE

## 2023-10-16 SDOH — HEALTH STABILITY: PHYSICAL HEALTH: ON AVERAGE, HOW MANY DAYS PER WEEK DO YOU ENGAGE IN MODERATE TO STRENUOUS EXERCISE (LIKE A BRISK WALK)?: 0 DAYS

## 2023-10-16 SDOH — ECONOMIC STABILITY: FOOD INSECURITY: WITHIN THE PAST 12 MONTHS, THE FOOD YOU BOUGHT JUST DIDN'T LAST AND YOU DIDN'T HAVE MONEY TO GET MORE.: NEVER TRUE

## 2023-10-16 ASSESSMENT — PATIENT HEALTH QUESTIONNAIRE - PHQ9
SUM OF ALL RESPONSES TO PHQ QUESTIONS 1-9: 0
10. IF YOU CHECKED OFF ANY PROBLEMS, HOW DIFFICULT HAVE THESE PROBLEMS MADE IT FOR YOU TO DO YOUR WORK, TAKE CARE OF THINGS AT HOME, OR GET ALONG WITH OTHER PEOPLE: 0
5. POOR APPETITE OR OVEREATING: 0
6. FEELING BAD ABOUT YOURSELF - OR THAT YOU ARE A FAILURE OR HAVE LET YOURSELF OR YOUR FAMILY DOWN: 0
8. MOVING OR SPEAKING SO SLOWLY THAT OTHER PEOPLE COULD HAVE NOTICED. OR THE OPPOSITE, BEING SO FIGETY OR RESTLESS THAT YOU HAVE BEEN MOVING AROUND A LOT MORE THAN USUAL: 0
SUM OF ALL RESPONSES TO PHQ QUESTIONS 1-9: 0
7. TROUBLE CONCENTRATING ON THINGS, SUCH AS READING THE NEWSPAPER OR WATCHING TELEVISION: 0
2. FEELING DOWN, DEPRESSED OR HOPELESS: 0
3. TROUBLE FALLING OR STAYING ASLEEP: 0
SUM OF ALL RESPONSES TO PHQ QUESTIONS 1-9: 0
SUM OF ALL RESPONSES TO PHQ9 QUESTIONS 1 & 2: 0
9. THOUGHTS THAT YOU WOULD BE BETTER OFF DEAD, OR OF HURTING YOURSELF: 0
1. LITTLE INTEREST OR PLEASURE IN DOING THINGS: 0
4. FEELING TIRED OR HAVING LITTLE ENERGY: 0
SUM OF ALL RESPONSES TO PHQ QUESTIONS 1-9: 0

## 2023-10-16 ASSESSMENT — LIFESTYLE VARIABLES
HOW MANY STANDARD DRINKS CONTAINING ALCOHOL DO YOU HAVE ON A TYPICAL DAY: PATIENT DOES NOT DRINK
HOW OFTEN DO YOU HAVE A DRINK CONTAINING ALCOHOL: NEVER
HOW MANY STANDARD DRINKS CONTAINING ALCOHOL DO YOU HAVE ON A TYPICAL DAY: 0
HOW OFTEN DO YOU HAVE SIX OR MORE DRINKS ON ONE OCCASION: 1
HOW OFTEN DO YOU HAVE A DRINK CONTAINING ALCOHOL: 1

## 2023-10-20 ENCOUNTER — TELEPHONE (OUTPATIENT)
Dept: CASE MANAGEMENT | Age: 68
End: 2023-10-20

## 2023-10-26 ENCOUNTER — HOSPITAL ENCOUNTER (OUTPATIENT)
Dept: CT IMAGING | Age: 68
Discharge: HOME | End: 2023-10-28
Attending: FAMILY MEDICINE
Payer: MEDICARE

## 2023-10-26 ENCOUNTER — HOSPITAL ENCOUNTER (OUTPATIENT)
Dept: MAMMOGRAPHY | Age: 68
Discharge: HOME | End: 2023-10-28
Attending: FAMILY MEDICINE
Payer: MEDICARE

## 2023-10-26 ENCOUNTER — HOSPITAL ENCOUNTER (OUTPATIENT)
Dept: BONE DENSITY | Age: 68
Discharge: HOME | End: 2023-10-28
Attending: FAMILY MEDICINE
Payer: MEDICARE

## 2023-10-26 DIAGNOSIS — Z12.31 VISIT FOR SCREENING MAMMOGRAM: ICD-10-CM

## 2023-10-26 DIAGNOSIS — Z87.891 PERSONAL HISTORY OF TOBACCO USE: ICD-10-CM

## 2023-10-26 DIAGNOSIS — E28.39 OVARIAN FAILURE: ICD-10-CM

## 2023-10-26 PROCEDURE — 71271 CT THORAX LUNG CANCER SCR C-: CPT

## 2023-10-26 PROCEDURE — 77063 BREAST TOMOSYNTHESIS BI: CPT

## 2023-10-26 PROCEDURE — 77080 DXA BONE DENSITY AXIAL: CPT

## 2023-10-27 RX ORDER — ALENDRONATE SODIUM 70 MG/1
70 TABLET ORAL
Qty: 12 TABLET | Refills: 1 | Status: SHIPPED | OUTPATIENT
Start: 2023-10-27

## 2024-02-06 DIAGNOSIS — G89.29 CHRONIC BILATERAL LOW BACK PAIN WITHOUT SCIATICA: ICD-10-CM

## 2024-02-06 DIAGNOSIS — M54.50 CHRONIC BILATERAL LOW BACK PAIN WITHOUT SCIATICA: ICD-10-CM

## 2024-02-06 RX ORDER — ATORVASTATIN CALCIUM 40 MG/1
TABLET, FILM COATED ORAL
Qty: 90 TABLET | Refills: 1 | Status: SHIPPED | OUTPATIENT
Start: 2024-02-06

## 2024-02-06 RX ORDER — TRAMADOL HYDROCHLORIDE 50 MG/1
50 TABLET ORAL EVERY 6 HOURS PRN
Qty: 120 TABLET | Refills: 2 | Status: SHIPPED | OUTPATIENT
Start: 2024-02-06 | End: 2024-05-06

## 2024-02-06 NOTE — TELEPHONE ENCOUNTER
Atorvastatin 40 mg  Tramadol 50    [place], [place].    AWV 10/16/23    Health Maintenance   Topic Date Due    Respiratory Syncytial Virus (RSV) Pregnant or age 60 yrs+ (1 - 1-dose 60+ series) Never done    Shingles vaccine (2 of 3) 12/26/2015    Pneumococcal 65+ years Vaccine (3 - PPSV23 or PCV20) 11/12/2021    Flu vaccine (1) 08/01/2023    COVID-19 Vaccine (3 - 2023-24 season) 09/01/2023    Lipids  08/23/2024    Depression Monitoring  10/16/2024    Annual Wellness Visit (Medicare)  10/16/2024    Low dose CT lung screening &/or counseling  10/26/2024    Breast cancer screen  10/26/2025    DTaP/Tdap/Td vaccine (3 - Td or Tdap) 04/10/2027    Colorectal Cancer Screen  08/14/2030    DEXA (modify frequency per FRAX score)  Completed    Hepatitis C screen  Completed    Hepatitis A vaccine  Aged Out    Hepatitis B vaccine  Aged Out    Hib vaccine  Aged Out    Polio vaccine  Aged Out    Meningococcal (ACWY) vaccine  Aged Out             (applicable per patient's age: Cancer Screenings, Depression Screening, Fall Risk Screening, Immunizations)    LDL Cholesterol (mg/dL)   Date Value   08/23/2023 48     AST (U/L)   Date Value   08/23/2023 24     ALT (U/L)   Date Value   08/23/2023 14     BUN (mg/dL)   Date Value   08/23/2023 14      (goal A1C is < 7)   (goal LDL is <100) need 30-50% reduction from baseline     BP Readings from Last 3 Encounters:   10/16/23 128/78   08/23/23 132/80   10/11/22 124/80    (goal /80)      All Future Testing planned in [facility]:  Lab Frequency Next Occurrence       Next Visit Date:  No future appointments.         Patient Active Problem List:     Dysthymia     Amaurosis fugax     History of laser assisted in situ keratomileusis     Panlobular emphysema (HCC)     Essential hypertension     Atherosclerosis of native artery of left lower extremity with intermittent claudication (HCC)

## 2024-02-23 DIAGNOSIS — M54.50 CHRONIC BILATERAL LOW BACK PAIN WITHOUT SCIATICA: ICD-10-CM

## 2024-02-23 DIAGNOSIS — G89.29 CHRONIC BILATERAL LOW BACK PAIN WITHOUT SCIATICA: ICD-10-CM

## 2024-02-23 RX ORDER — TRAMADOL HYDROCHLORIDE 50 MG/1
50 TABLET ORAL EVERY 6 HOURS PRN
Qty: 120 TABLET | Refills: 2 | Status: SHIPPED | OUTPATIENT
Start: 2024-02-23 | End: 2024-05-23

## 2024-02-23 NOTE — TELEPHONE ENCOUNTER
Tramadol 50 mg    [place], [place].    Spoke with the pharmacist in [place] and they had to void her prescription and only gave her a 7 day supply. She said [place] rules are in the sig or note to pharmacy - instead of putting reduce dose taken as pain becomes manageable, it needs to say chronic. Legally they can't fill it if it doesn't say that.      Health Maintenance   Topic Date Due    Respiratory Syncytial Virus (RSV) Pregnant or age 60 yrs+ (1 - 1-dose 60+ series) Never done    Shingles vaccine (2 of 3) 12/26/2015    Pneumococcal 65+ years Vaccine (3 - PPSV23 or PCV20) 11/12/2021    Flu vaccine (1) 08/01/2023    COVID-19 Vaccine (3 - 2023-24 season) 09/01/2023    Lipids  08/23/2024    Depression Monitoring  10/16/2024    Annual Wellness Visit (Medicare)  10/16/2024    Low dose CT lung screening &/or counseling  10/26/2024    Breast cancer screen  10/26/2025    DTaP/Tdap/Td vaccine (3 - Td or Tdap) 04/10/2027    Colorectal Cancer Screen  08/14/2030    DEXA (modify frequency per FRAX score)  Completed    Hepatitis C screen  Completed    Hepatitis A vaccine  Aged Out    Hepatitis B vaccine  Aged Out    Hib vaccine  Aged Out    Polio vaccine  Aged Out    Meningococcal (ACWY) vaccine  Aged Out             (applicable per patient's age: Cancer Screenings, Depression Screening, Fall Risk Screening, Immunizations)    LDL Cholesterol (mg/dL)   Date Value   08/23/2023 48     AST (U/L)   Date Value   08/23/2023 24     ALT (U/L)   Date Value   08/23/2023 14     BUN (mg/dL)   Date Value   08/23/2023 14      (goal A1C is < 7)   (goal LDL is <100) need 30-50% reduction from baseline     BP Readings from Last 3 Encounters:   10/16/23 128/78   08/23/23 132/80   10/11/22 124/80    (goal /80)      All Future Testing planned in Care[facility]:  Lab Frequency Next Occurrence       Next Visit Date:  No future appointments.         Patient Active Problem List:     Dysthymia     Amaurosis fugax     History of laser assisted in

## 2024-03-08 NOTE — PATIENT INSTRUCTIONS
SURVEY:    You may be receiving a survey from [facility] regarding your visit today. Please complete the survey to enable us to provide the highest quality of care to you and your family. If you cannot score us a very good on any question, please call the office to discuss how we could of made your experience a very good one. Thank you.
3

## 2024-05-23 RX ORDER — AMLODIPINE BESYLATE 5 MG/1
TABLET ORAL
Qty: 90 TABLET | Refills: 1 | Status: SHIPPED | OUTPATIENT
Start: 2024-05-23

## 2024-05-23 RX ORDER — OXYBUTYNIN CHLORIDE 10 MG/1
10 TABLET, EXTENDED RELEASE ORAL DAILY
Qty: 90 TABLET | Refills: 1 | Status: SHIPPED | OUTPATIENT
Start: 2024-05-23

## 2024-05-23 RX ORDER — ATORVASTATIN CALCIUM 40 MG/1
TABLET, FILM COATED ORAL
Qty: 90 TABLET | Refills: 1 | Status: SHIPPED | OUTPATIENT
Start: 2024-05-23

## 2024-05-23 NOTE — TELEPHONE ENCOUNTER
Patient asking for new scripts to be sent to [facility] in [place] -     Amlodipine  Oxybutynin  Cilostazol  Atorvastatin  Pantoprazole  Tramadol

## 2024-05-23 NOTE — TELEPHONE ENCOUNTER
Patient was due for 6 month appt for medication management in April, please schedule    Should already have refills for most of the medications

## 2024-05-30 ENCOUNTER — OFFICE VISIT (OUTPATIENT)
Dept: FAMILY MEDICINE CLINIC | Age: 69
End: 2024-05-30

## 2024-05-30 VITALS
OXYGEN SATURATION: 96 % | HEART RATE: 97 BPM | DIASTOLIC BLOOD PRESSURE: 72 MMHG | WEIGHT: 136 LBS | BODY MASS INDEX: 26.7 KG/M2 | HEIGHT: 60 IN | SYSTOLIC BLOOD PRESSURE: 124 MMHG

## 2024-05-30 DIAGNOSIS — I73.9 PVD (PERIPHERAL VASCULAR DISEASE) WITH CLAUDICATION (HCC): Primary | ICD-10-CM

## 2024-05-30 DIAGNOSIS — J43.1 PANLOBULAR EMPHYSEMA (HCC): ICD-10-CM

## 2024-05-30 DIAGNOSIS — M81.0 AGE-RELATED OSTEOPOROSIS WITHOUT CURRENT PATHOLOGICAL FRACTURE: ICD-10-CM

## 2024-05-30 RX ORDER — ALENDRONATE SODIUM 70 MG/1
70 TABLET ORAL
Qty: 12 TABLET | Refills: 1 | Status: CANCELLED | OUTPATIENT
Start: 2024-05-30

## 2024-05-30 RX ORDER — BUPROPION HYDROCHLORIDE 100 MG/1
100 TABLET, EXTENDED RELEASE ORAL 2 TIMES DAILY
Qty: 180 TABLET | Refills: 1 | Status: SHIPPED | OUTPATIENT
Start: 2024-05-30

## 2024-05-30 RX ORDER — CILOSTAZOL 100 MG/1
100 TABLET ORAL 2 TIMES DAILY
Qty: 180 TABLET | Refills: 1 | Status: SHIPPED | OUTPATIENT
Start: 2024-05-30

## 2024-05-30 ASSESSMENT — PATIENT HEALTH QUESTIONNAIRE - PHQ9
5. POOR APPETITE OR OVEREATING: NOT AT ALL
3. TROUBLE FALLING OR STAYING ASLEEP: NOT AT ALL
SUM OF ALL RESPONSES TO PHQ QUESTIONS 1-9: 0
7. TROUBLE CONCENTRATING ON THINGS, SUCH AS READING THE NEWSPAPER OR WATCHING TELEVISION: NOT AT ALL
8. MOVING OR SPEAKING SO SLOWLY THAT OTHER PEOPLE COULD HAVE NOTICED. OR THE OPPOSITE, BEING SO FIGETY OR RESTLESS THAT YOU HAVE BEEN MOVING AROUND A LOT MORE THAN USUAL: NOT AT ALL
SUM OF ALL RESPONSES TO PHQ QUESTIONS 1-9: 0
SUM OF ALL RESPONSES TO PHQ9 QUESTIONS 1 & 2: 0
1. LITTLE INTEREST OR PLEASURE IN DOING THINGS: NOT AT ALL
SUM OF ALL RESPONSES TO PHQ QUESTIONS 1-9: 0
10. IF YOU CHECKED OFF ANY PROBLEMS, HOW DIFFICULT HAVE THESE PROBLEMS MADE IT FOR YOU TO DO YOUR WORK, TAKE CARE OF THINGS AT HOME, OR GET ALONG WITH OTHER PEOPLE: NOT DIFFICULT AT ALL
9. THOUGHTS THAT YOU WOULD BE BETTER OFF DEAD, OR OF HURTING YOURSELF: NOT AT ALL
4. FEELING TIRED OR HAVING LITTLE ENERGY: NOT AT ALL
6. FEELING BAD ABOUT YOURSELF - OR THAT YOU ARE A FAILURE OR HAVE LET YOURSELF OR YOUR FAMILY DOWN: NOT AT ALL
SUM OF ALL RESPONSES TO PHQ QUESTIONS 1-9: 0
2. FEELING DOWN, DEPRESSED OR HOPELESS: NOT AT ALL

## 2024-05-30 NOTE — PROGRESS NOTES
04:58 PM    HGB 15.3 06/11/2019 04:58 PM    HCT 44.6 06/11/2019 04:58 PM    MCV 93.2 06/11/2019 04:58 PM    MCH 32.0 06/11/2019 04:58 PM    MCHC 34.3 06/11/2019 04:58 PM    RDW 14.1 06/11/2019 04:58 PM     06/11/2019 04:58 PM    MPV NOT REPORTED 06/11/2019 04:58 PM     Lab Results   Component Value Date/Time    TSH 1.71 07/20/2020 04:12 PM     Lab Results   Component Value Date/Time    CHOL 138 08/23/2023 02:30 PM    LDL 48 08/23/2023 02:30 PM    HDL 72 08/23/2023 02:30 PM         Assessment & Plan:        Diagnosis Orders   1. PVD (peripheral vascular disease) with claudication (HCC)        2. Panlobular emphysema (HCC)        3. Age-related osteoporosis without current pathological fracture          H/o claudication, none currently. Cont pletal, statin, aspirin. Advised full smoking cessation. Cont wellbutrin for that purpose.  Controlled, again advised quitting smoking altogether. No meds.  Osteoporosis in lumbar spine and samara femoral necks. Advised to take fosamax as prescribed, reassured pt, did review most common side effects and admin directions. Cont ca and vit d also.  F/u 6 mos.        Requested Prescriptions     Signed Prescriptions Disp Refills    buPROPion (WELLBUTRIN SR) 100 MG extended release tablet 180 tablet 1     Sig: Take 1 tablet by mouth 2 times daily    cilostazol (PLETAL) 100 MG tablet 180 tablet 1     Sig: Take 1 tablet by mouth 2 times daily         Patient Instructions   [employer] SURVEY:    You may be receiving a survey from [employer] regarding your visit today.    You may get this in the mail, through your MyChart or in your email.     Please complete the survey to enable us to provide the highest quality of care to you and your family.    If you cannot score us as very good ( 5 Stars) on any question, please feel free to call the office to discuss how we could have made your experience exceptional.     Thank you.    Clinical Care Team:   Dr. Maddie Pandya, DO

## 2024-05-30 NOTE — PATIENT INSTRUCTIONS
[employer] SURVEY:    You may be receiving a survey from [employer] regarding your visit today.    You may get this in the mail, through your MyChart or in your email.     Please complete the survey to enable us to provide the highest quality of care to you and your family.    If you cannot score us as very good ( 5 Stars) on any question, please feel free to call the office to discuss how we could have made your experience exceptional.     Thank you.    Clinical Care Team:   DO Ko Infante CMA                                     Triage: Pinky Nelson CMA              Clerical Team:    Pinky Miranda     [place] [phone]           Billing questions: [phone]           Medical Records Request: [phone]

## 2024-08-27 DIAGNOSIS — M54.50 CHRONIC BILATERAL LOW BACK PAIN WITHOUT SCIATICA: ICD-10-CM

## 2024-08-27 DIAGNOSIS — G89.29 CHRONIC BILATERAL LOW BACK PAIN WITHOUT SCIATICA: ICD-10-CM

## 2024-08-27 RX ORDER — TRAMADOL HYDROCHLORIDE 50 MG/1
50 TABLET ORAL EVERY 6 HOURS PRN
Qty: 120 TABLET | Refills: 0 | Status: SHIPPED | OUTPATIENT
Start: 2024-08-27 | End: 2024-11-25

## 2024-08-27 NOTE — TELEPHONE ENCOUNTER
Last visit:  5/30/2024  Next Visit Date:    Future Appointments   Date Time Provider Department Center   10/30/2024 12:40 PM Maddie Pandya DO [facility]         Medication List:  Prior to Admission medications    Medication Sig Start Date End Date Taking? Authorizing Provider   buPROPion (WELLBUTRIN SR) 100 MG extended release tablet Take 1 tablet by mouth 2 times daily 5/30/24   Maddie Pandya DO   cilostazol (PLETAL) 100 MG tablet Take 1 tablet by mouth 2 times daily 5/30/24   Maddie Pandya DO   amLODIPine (NORVASC) 5 MG tablet 1 by mouth every day 5/23/24   Maddie Pandya DO   atorvastatin (LIPITOR) 40 MG tablet TAKE 1 TABLET BY MOUTH ONCE DAILY 5/23/24   Maddie Pandya DO   oxyBUTYnin (DITROPAN XL) 10 MG extended release tablet Take 1 tablet by mouth daily 5/23/24   Maddie Pandya DO   alendronate (FOSAMAX) 70 MG tablet Take 1 tablet by mouth every 7 days 10/27/23   Maddie Pandya DO   pantoprazole (PROTONIX) 20 MG tablet Take 1 tablet by mouth  daily 8/23/23   Maddie Pandya DO   cyclobenzaprine (FLEXERIL) 10 MG tablet Take 1 tablet by mouth 3 times daily as needed for Muscle spasms 8/23/23   Maddie Pandya DO   aspirin EC 81 MG EC tablet Take 1 tablet by mouth daily 5/18/22   Maddie Pandya DO

## 2024-10-30 ENCOUNTER — OFFICE VISIT (OUTPATIENT)
Dept: FAMILY MEDICINE CLINIC | Age: 69
End: 2024-10-30

## 2024-10-30 ENCOUNTER — HOSPITAL ENCOUNTER (OUTPATIENT)
Age: 69
Discharge: HOME | End: 2024-10-30
Payer: MEDICARE

## 2024-10-30 VITALS
WEIGHT: 134 LBS | SYSTOLIC BLOOD PRESSURE: 120 MMHG | HEART RATE: 88 BPM | OXYGEN SATURATION: 94 % | DIASTOLIC BLOOD PRESSURE: 78 MMHG | HEIGHT: 60 IN | BODY MASS INDEX: 26.31 KG/M2

## 2024-10-30 DIAGNOSIS — Z00.00 MEDICARE ANNUAL WELLNESS VISIT, SUBSEQUENT: Primary | ICD-10-CM

## 2024-10-30 DIAGNOSIS — I10 ESSENTIAL HYPERTENSION: ICD-10-CM

## 2024-10-30 DIAGNOSIS — I73.9 PVD (PERIPHERAL VASCULAR DISEASE) WITH CLAUDICATION (HCC): ICD-10-CM

## 2024-10-30 DIAGNOSIS — Z12.31 VISIT FOR SCREENING MAMMOGRAM: ICD-10-CM

## 2024-10-30 DIAGNOSIS — Z87.891 PERSONAL HISTORY OF TOBACCO USE: ICD-10-CM

## 2024-10-30 DIAGNOSIS — J43.1 PANLOBULAR EMPHYSEMA (HCC): ICD-10-CM

## 2024-10-30 LAB
ALBUMIN SERPL-MCNC: 4.1 G/DL (ref 3.5–5.2)
ALP SERPL-CCNC: 133 U/L (ref 35–104)
ALT SERPL-CCNC: 12 U/L (ref 5–33)
ANION GAP SERPL CALCULATED.3IONS-SCNC: 9 MMOL/L (ref 9–17)
AST SERPL-CCNC: 24 U/L
BASOPHILS # BLD: 0.03 K/UL (ref 0–0.2)
BASOPHILS NFR BLD: 0 % (ref 0–2)
BILIRUB SERPL-MCNC: 0.4 MG/DL (ref 0.3–1.2)
BUN SERPL-MCNC: 14 MG/DL (ref 8–23)
BUN/CREAT SERPL: 20 (ref 9–20)
CALCIUM SERPL-MCNC: 9.6 MG/DL (ref 8.6–10.4)
CHLORIDE SERPL-SCNC: 99 MMOL/L (ref 98–107)
CHOLEST SERPL-MCNC: 123 MG/DL (ref 0–199)
CHOLESTEROL/HDL RATIO: 1.7
CO2 SERPL-SCNC: 29 MMOL/L (ref 20–31)
CREAT SERPL-MCNC: 0.7 MG/DL (ref 0.5–0.9)
EOSINOPHIL # BLD: 0.24 K/UL (ref 0–0.4)
EOSINOPHILS RELATIVE PERCENT: 3 % (ref 0–5)
ERYTHROCYTE [DISTWIDTH] IN BLOOD BY AUTOMATED COUNT: 12.9 % (ref 12.1–15.2)
GFR, ESTIMATED: >90 ML/MIN/1.73M2
GLUCOSE SERPL-MCNC: 105 MG/DL (ref 70–99)
HCT VFR BLD AUTO: 41.8 % (ref 36–46)
HDLC SERPL-MCNC: 72 MG/DL
HGB BLD-MCNC: 14.2 G/DL (ref 12–16)
IMM GRANULOCYTES # BLD AUTO: 0.01 K/UL (ref 0–0.3)
IMM GRANULOCYTES NFR BLD: 0 % (ref 0–5)
LDLC SERPL CALC-MCNC: 34 MG/DL (ref 0–100)
LYMPHOCYTES NFR BLD: 2.72 K/UL (ref 1–4.8)
LYMPHOCYTES RELATIVE PERCENT: 37 % (ref 15–40)
MCH RBC QN AUTO: 30.7 PG (ref 26–34)
MCHC RBC AUTO-ENTMCNC: 34 G/DL (ref 31–37)
MCV RBC AUTO: 90.3 FL (ref 80–100)
MONOCYTES NFR BLD: 0.5 K/UL (ref 0–1)
MONOCYTES NFR BLD: 7 % (ref 4–8)
NEUTROPHILS NFR BLD: 53 % (ref 47–75)
NEUTS SEG NFR BLD: 3.89 K/UL (ref 2.5–7)
PLATELET # BLD AUTO: 267 K/UL (ref 140–450)
PMV BLD AUTO: 8.2 FL (ref 6–12)
POTASSIUM SERPL-SCNC: 4.4 MMOL/L (ref 3.7–5.3)
PROT SERPL-MCNC: 7 G/DL (ref 6.4–8.3)
RBC # BLD AUTO: 4.63 M/UL (ref 4–5.2)
SODIUM SERPL-SCNC: 137 MMOL/L (ref 135–144)
TRIGL SERPL-MCNC: 84 MG/DL
VLDLC SERPL CALC-MCNC: 17 MG/DL (ref 1–30)
WBC OTHER # BLD: 7.4 K/UL (ref 3.5–11)

## 2024-10-30 PROCEDURE — 36415 COLL VENOUS BLD VENIPUNCTURE: CPT

## 2024-10-30 PROCEDURE — 80053 COMPREHEN METABOLIC PANEL: CPT

## 2024-10-30 PROCEDURE — 80061 LIPID PANEL: CPT

## 2024-10-30 PROCEDURE — 85025 COMPLETE CBC W/AUTO DIFF WBC: CPT

## 2024-10-30 RX ORDER — ATORVASTATIN CALCIUM 40 MG/1
TABLET, FILM COATED ORAL
Qty: 90 TABLET | Refills: 1 | Status: SHIPPED | OUTPATIENT
Start: 2024-10-30

## 2024-10-30 RX ORDER — CYCLOBENZAPRINE HCL 10 MG
10 TABLET ORAL 3 TIMES DAILY PRN
Qty: 90 TABLET | Refills: 1 | Status: SHIPPED | OUTPATIENT
Start: 2024-10-30

## 2024-10-30 RX ORDER — CILOSTAZOL 100 MG/1
100 TABLET ORAL 2 TIMES DAILY
Qty: 180 TABLET | Refills: 1 | Status: SHIPPED | OUTPATIENT
Start: 2024-10-30

## 2024-10-30 RX ORDER — PANTOPRAZOLE SODIUM 20 MG/1
TABLET, DELAYED RELEASE ORAL
Qty: 90 TABLET | Refills: 3 | Status: SHIPPED | OUTPATIENT
Start: 2024-10-30

## 2024-10-30 RX ORDER — OXYBUTYNIN CHLORIDE 10 MG/1
10 TABLET, EXTENDED RELEASE ORAL DAILY
Qty: 90 TABLET | Refills: 1 | Status: SHIPPED | OUTPATIENT
Start: 2024-10-30

## 2024-10-30 RX ORDER — AMLODIPINE BESYLATE 5 MG/1
TABLET ORAL
Qty: 90 TABLET | Refills: 1 | Status: SHIPPED | OUTPATIENT
Start: 2024-10-30

## 2024-10-30 RX ORDER — BUPROPION HYDROCHLORIDE 100 MG/1
100 TABLET, EXTENDED RELEASE ORAL 2 TIMES DAILY
Qty: 180 TABLET | Refills: 1 | Status: SHIPPED | OUTPATIENT
Start: 2024-10-30

## 2024-10-30 SDOH — HEALTH STABILITY: PHYSICAL HEALTH: ON AVERAGE, HOW MANY DAYS PER WEEK DO YOU ENGAGE IN MODERATE TO STRENUOUS EXERCISE (LIKE A BRISK WALK)?: 0 DAYS

## 2024-10-30 SDOH — ECONOMIC STABILITY: FOOD INSECURITY: WITHIN THE PAST 12 MONTHS, THE FOOD YOU BOUGHT JUST DIDN'T LAST AND YOU DIDN'T HAVE MONEY TO GET MORE.: NEVER TRUE

## 2024-10-30 SDOH — ECONOMIC STABILITY: INCOME INSECURITY: HOW HARD IS IT FOR YOU TO PAY FOR THE VERY BASICS LIKE FOOD, HOUSING, MEDICAL CARE, AND HEATING?: NOT HARD AT ALL

## 2024-10-30 SDOH — ECONOMIC STABILITY: FOOD INSECURITY: WITHIN THE PAST 12 MONTHS, YOU WORRIED THAT YOUR FOOD WOULD RUN OUT BEFORE YOU GOT MONEY TO BUY MORE.: NEVER TRUE

## 2024-10-30 ASSESSMENT — LIFESTYLE VARIABLES
HOW OFTEN DO YOU HAVE A DRINK CONTAINING ALCOHOL: 1
HOW OFTEN DO YOU HAVE A DRINK CONTAINING ALCOHOL: NEVER
HOW MANY STANDARD DRINKS CONTAINING ALCOHOL DO YOU HAVE ON A TYPICAL DAY: PATIENT DOES NOT DRINK
HOW OFTEN DO YOU HAVE SIX OR MORE DRINKS ON ONE OCCASION: 1
HOW MANY STANDARD DRINKS CONTAINING ALCOHOL DO YOU HAVE ON A TYPICAL DAY: 0

## 2024-10-30 ASSESSMENT — PATIENT HEALTH QUESTIONNAIRE - PHQ9
10. IF YOU CHECKED OFF ANY PROBLEMS, HOW DIFFICULT HAVE THESE PROBLEMS MADE IT FOR YOU TO DO YOUR WORK, TAKE CARE OF THINGS AT HOME, OR GET ALONG WITH OTHER PEOPLE: NOT DIFFICULT AT ALL
4. FEELING TIRED OR HAVING LITTLE ENERGY: NOT AT ALL
7. TROUBLE CONCENTRATING ON THINGS, SUCH AS READING THE NEWSPAPER OR WATCHING TELEVISION: NOT AT ALL
9. THOUGHTS THAT YOU WOULD BE BETTER OFF DEAD, OR OF HURTING YOURSELF: NOT AT ALL
1. LITTLE INTEREST OR PLEASURE IN DOING THINGS: NOT AT ALL
2. FEELING DOWN, DEPRESSED OR HOPELESS: NOT AT ALL
8. MOVING OR SPEAKING SO SLOWLY THAT OTHER PEOPLE COULD HAVE NOTICED. OR THE OPPOSITE, BEING SO FIGETY OR RESTLESS THAT YOU HAVE BEEN MOVING AROUND A LOT MORE THAN USUAL: NOT AT ALL
SUM OF ALL RESPONSES TO PHQ QUESTIONS 1-9: 0
3. TROUBLE FALLING OR STAYING ASLEEP: NOT AT ALL
6. FEELING BAD ABOUT YOURSELF - OR THAT YOU ARE A FAILURE OR HAVE LET YOURSELF OR YOUR FAMILY DOWN: NOT AT ALL
SUM OF ALL RESPONSES TO PHQ QUESTIONS 1-9: 0
5. POOR APPETITE OR OVEREATING: NOT AT ALL
SUM OF ALL RESPONSES TO PHQ9 QUESTIONS 1 & 2: 0

## 2024-10-30 NOTE — PATIENT INSTRUCTIONS
[employer] SURVEY:    You may be receiving a survey from [employer] regarding your visit today.    You may get this in the mail, through your MyChart or in your email.     Please complete the survey to enable us to provide the highest quality of care to you and your family.    If you cannot score us as very good ( 5 Stars) on any question, please feel free to call the office to discuss how we could have made your experience exceptional.     Thank you.    Clinical Care Team:   DO Ko Infante CMA                                     Triage: Pinky Nelson CMA              Clerical Team:    Pinky Miranda     [place] [phone]           Billing questions: [phone]           Medical Records Request: [phone]            Learning About Lung Cancer Screening  What is screening for lung cancer?     Lung cancer screening is a way to find some lung cancers early, before a person has any symptoms of the cancer.  Lung cancer screening may help those who have the highest risk for lung cancer--people age 50 and older who are or were heavy smokers. For most people, who aren't at increased risk, screening for lung cancer probably isn't helpful.  Screening won't prevent cancer. And it may not find all lung cancers. Lung cancer screening may lower the risk of dying from lung cancer in a small number of people.  How is it done?  Lung cancer screening is done with a low-dose CT (computed tomography) scan. A CT scan uses X-rays, or radiation, to make detailed pictures of your body. Experts recommend that screening be done in medical centers that focus on finding and treating lung cancer.  Who is screening recommended for?  Lung cancer screening is recommended for people age 50 and older who are or were heavy smokers. That means people with a smoking history of

## 2024-10-30 NOTE — PROGRESS NOTES
Medicare Annual Wellness Visit    Mavis Watkins is here for Medicare AWV, Hypertension, COPD, and Back Pain    Assessment & Plan   Medicare annual wellness visit, subsequent  Visit for screening mammogram  -     IRVING RADHA DIGITAL SCREEN BILATERAL; Future  PVD (peripheral vascular disease) with claudication (HCC)  -     Lipid Panel; Future  Panlobular emphysema (HCC)  -     Comprehensive Metabolic Panel; Future  -     CBC with Auto Differential; Future  Essential hypertension  -     Lipid Panel; Future  Personal history of tobacco use  -     RI VISIT TO DISCUSS LUNG CA SCREEN W LDCT  -     CT Lung Screen (Initial/Annual/Baseline); Future    Recommendations for Preventive Services Due: see orders and patient instructions/AVS.  Recommended screening schedule for the next 5-10 years is provided to the patient in written form: see Patient Instructions/AVS.     Return in about 6 months (around 4/30/2025) for HTN.     Subjective       Patient's complete Health Risk Assessment and screening values have been reviewed and are found in Flowsheets. The following problems were reviewed today and where indicated follow up appointments were made and/or referrals ordered.    Positive Risk Factor Screenings with Interventions:            Controlled Medication Review:    Today's Pain Level: No data recorded   Opioid Risk: (Low risk score <55) Opioid risk score: 5    Patient is low risk for opioid use disorder or overdose.    Last PDMP Dylan as Reviewed:  Review User Review Instant Review Result   [facility] 8/27/2024 12:42 PM     Reviewed PDMP [1]     Last Controlled Substance Monitoring Documentation      Flowsheet Row Refill from 12/21/2020 in [facility] [place]   Periodic Controlled Substance Monitoring Obtaining appropriate analgesic effect of treatment. filed at 12/22/2020 5737             Inactivity:  On average, how many days per week do you engage in moderate to strenuous exercise (like a brisk walk)?: 0 days

## 2025-01-03 DIAGNOSIS — G89.29 CHRONIC BILATERAL LOW BACK PAIN WITHOUT SCIATICA: ICD-10-CM

## 2025-01-03 DIAGNOSIS — M54.50 CHRONIC BILATERAL LOW BACK PAIN WITHOUT SCIATICA: ICD-10-CM

## 2025-01-03 RX ORDER — TRAMADOL HYDROCHLORIDE 50 MG/1
50 TABLET ORAL EVERY 6 HOURS PRN
Qty: 120 TABLET | Refills: 0 | Status: SHIPPED | OUTPATIENT
Start: 2025-01-03 | End: 2025-04-03

## 2025-01-03 RX ORDER — CILOSTAZOL 100 MG/1
100 TABLET ORAL 2 TIMES DAILY
Qty: 60 TABLET | Refills: 0 | Status: SHIPPED | OUTPATIENT
Start: 2025-01-03

## 2025-01-03 RX ORDER — OXYBUTYNIN CHLORIDE 10 MG/1
10 TABLET, EXTENDED RELEASE ORAL DAILY
Qty: 30 TABLET | Refills: 0 | Status: SHIPPED | OUTPATIENT
Start: 2025-01-03

## 2025-01-03 NOTE — TELEPHONE ENCOUNTER
Last OV: 10/30/2024  AWV and chronic   Last RX:    Next scheduled apt: Visit date not found        Pt requesting a refill   Pt in [place] for the winter

## 2025-01-06 DIAGNOSIS — M54.50 CHRONIC BILATERAL LOW BACK PAIN WITHOUT SCIATICA: ICD-10-CM

## 2025-01-06 DIAGNOSIS — G89.29 CHRONIC BILATERAL LOW BACK PAIN WITHOUT SCIATICA: ICD-10-CM

## 2025-01-06 RX ORDER — PANTOPRAZOLE SODIUM 20 MG/1
TABLET, DELAYED RELEASE ORAL
Qty: 90 TABLET | Refills: 3 | Status: SHIPPED | OUTPATIENT
Start: 2025-01-06

## 2025-01-06 RX ORDER — TRAMADOL HYDROCHLORIDE 50 MG/1
50 TABLET ORAL EVERY 6 HOURS PRN
Qty: 120 TABLET | Refills: 0 | Status: SHIPPED | OUTPATIENT
Start: 2025-01-06 | End: 2025-04-06

## 2025-01-06 RX ORDER — CILOSTAZOL 100 MG/1
100 TABLET ORAL 2 TIMES DAILY
Qty: 180 TABLET | Refills: 1 | Status: SHIPPED | OUTPATIENT
Start: 2025-01-06

## 2025-01-06 NOTE — TELEPHONE ENCOUNTER
Cilostazol 100 mg BID  Protonix 20 mg     [facility] = [place], [place].    Health Maintenance   Topic Date Due    Diabetes screen  Never done    Respiratory Syncytial Virus (RSV) Pregnant or age 60 yrs+ (1 - Risk 60-74 years 1-dose series) Never done    Shingles vaccine (2 of 3) 12/26/2015    Pneumococcal 65+ years Vaccine (3 of 3 - PPSV23 or PCV20) 11/12/2021    Flu vaccine (1) 08/01/2024    COVID-19 Vaccine (3 - 2023-24 season) 09/01/2024    Lung Cancer Screening &/or Counseling  10/26/2024    Breast cancer screen  10/26/2025    Lipids  10/30/2025    Depression Monitoring  10/30/2025    Annual Wellness Visit (Medicare)  10/31/2025    DTaP/Tdap/Td vaccine (3 - Td or Tdap) 04/10/2027    Colorectal Cancer Screen  08/14/2030    DEXA (modify frequency per FRAX score)  Completed    Hepatitis C screen  Completed    Hepatitis A vaccine  Aged Out    Hepatitis B vaccine  Aged Out    Hib vaccine  Aged Out    Polio vaccine  Aged Out    Meningococcal (ACWY) vaccine  Aged Out             (applicable per patient's age: Cancer Screenings, Depression Screening, Fall Risk Screening, Immunizations)    AST (U/L)   Date Value   10/30/2024 24     ALT (U/L)   Date Value   10/30/2024 12     BUN (mg/dL)   Date Value   10/30/2024 14      (goal A1C is < 7)   (goal LDL is <100) need 30-50% reduction from baseline     BP Readings from Last 3 Encounters:   10/30/24 120/78   05/30/24 124/72   10/16/23 128/78    (goal /80)      All Future Testing planned in Care[facility]:  Lab Frequency Next Occurrence   IRVING RADHA DIGITAL SCREEN BILATERAL Once 10/30/2024   CT Lung Screen (Initial/Annual/Baseline) Once 10/30/2024       Next Visit Date:  No future appointments.         Patient Active Problem List:     Dysthymia     Amaurosis fugax     History of laser assisted in situ keratomileusis     Panlobular emphysema (HCC)     Essential hypertension     Atherosclerosis of native artery of left lower extremity with intermittent claudication (HCC)

## 2025-04-24 DIAGNOSIS — M54.50 CHRONIC BILATERAL LOW BACK PAIN WITHOUT SCIATICA: ICD-10-CM

## 2025-04-24 DIAGNOSIS — G89.29 CHRONIC BILATERAL LOW BACK PAIN WITHOUT SCIATICA: ICD-10-CM

## 2025-04-24 RX ORDER — TRAMADOL HYDROCHLORIDE 50 MG/1
50 TABLET ORAL EVERY 6 HOURS PRN
Qty: 120 TABLET | Refills: 0 | Status: SHIPPED | OUTPATIENT
Start: 2025-04-24 | End: 2025-07-23

## 2025-04-24 NOTE — TELEPHONE ENCOUNTER
Tramadol     [facility]    AWV 10/30/24    Health Maintenance   Topic Date Due    Respiratory Syncytial Virus (RSV) Pregnant or age 60 yrs+ (1 - Risk 60-74 years 1-dose series) Never done    Shingles vaccine (2 of 3) 12/26/2015    Pneumococcal 50+ years Vaccine (3 of 3 - PCV20 or PCV21) 11/12/2021    COVID-19 Vaccine (3 - 2024-25 season) 09/01/2024    Lung Cancer Screening &/or Counseling  10/26/2024    Flu vaccine (Season Ended) 08/01/2025    Breast cancer screen  10/26/2025    Lipids  10/30/2025    Depression Monitoring  10/30/2025    Annual Wellness Visit (Medicare)  10/31/2025    DTaP/Tdap/Td vaccine (3 - Td or Tdap) 04/10/2027    Colorectal Cancer Screen  08/14/2030    DEXA (modify frequency per FRAX score)  Completed    Hepatitis C screen  Completed    Hepatitis A vaccine  Aged Out    Hepatitis B vaccine  Aged Out    Hib vaccine  Aged Out    Polio vaccine  Aged Out    Meningococcal (ACWY) vaccine  Aged Out    Meningococcal B vaccine  Aged Out             (applicable per patient's age: Cancer Screenings, Depression Screening, Fall Risk Screening, Immunizations)    AST (U/L)   Date Value   10/30/2024 24     ALT (U/L)   Date Value   10/30/2024 12     BUN (mg/dL)   Date Value   10/30/2024 14      (goal A1C is < 7)   (goal LDL is <100) need 30-50% reduction from baseline     BP Readings from Last 3 Encounters:   10/30/24 120/78   05/30/24 124/72   10/16/23 128/78    (goal /80)      All Future Testing planned in CarePATH:  Lab Frequency Next Occurrence   IRVING RADHA DIGITAL SCREEN BILATERAL Once 10/30/2024   CT Lung Screen (Initial/Annual/Baseline) Once 10/30/2024       Next Visit Date:  No future appointments.         Patient Active Problem List:     Dysthymia     Amaurosis fugax     History of laser assisted in situ keratomileusis     Panlobular emphysema (HCC)     Essential hypertension     Atherosclerosis of native artery of left lower extremity with intermittent claudication

## 2025-05-12 ENCOUNTER — OFFICE VISIT (OUTPATIENT)
Dept: FAMILY MEDICINE CLINIC | Age: 70
End: 2025-05-12
Payer: MEDICARE

## 2025-05-12 VITALS
SYSTOLIC BLOOD PRESSURE: 128 MMHG | OXYGEN SATURATION: 97 % | HEART RATE: 87 BPM | WEIGHT: 134 LBS | BODY MASS INDEX: 26.17 KG/M2 | DIASTOLIC BLOOD PRESSURE: 70 MMHG

## 2025-05-12 DIAGNOSIS — J43.1 PANLOBULAR EMPHYSEMA (HCC): ICD-10-CM

## 2025-05-12 DIAGNOSIS — Z12.11 ENCOUNTER FOR COLORECTAL CANCER SCREENING: ICD-10-CM

## 2025-05-12 DIAGNOSIS — M54.50 CHRONIC BILATERAL LOW BACK PAIN WITHOUT SCIATICA: Primary | ICD-10-CM

## 2025-05-12 DIAGNOSIS — Z12.12 ENCOUNTER FOR COLORECTAL CANCER SCREENING: ICD-10-CM

## 2025-05-12 DIAGNOSIS — I73.9 PVD (PERIPHERAL VASCULAR DISEASE) WITH CLAUDICATION: ICD-10-CM

## 2025-05-12 DIAGNOSIS — R91.1 LUNG NODULE: ICD-10-CM

## 2025-05-12 DIAGNOSIS — D12.6 TUBULAR ADENOMA OF COLON: ICD-10-CM

## 2025-05-12 DIAGNOSIS — Z12.31 VISIT FOR SCREENING MAMMOGRAM: ICD-10-CM

## 2025-05-12 DIAGNOSIS — M81.0 AGE-RELATED OSTEOPOROSIS WITHOUT CURRENT PATHOLOGICAL FRACTURE: ICD-10-CM

## 2025-05-12 DIAGNOSIS — G89.29 CHRONIC BILATERAL LOW BACK PAIN WITHOUT SCIATICA: Primary | ICD-10-CM

## 2025-05-12 PROCEDURE — 99214 OFFICE O/P EST MOD 30 MIN: CPT | Performed by: FAMILY MEDICINE

## 2025-05-12 PROCEDURE — 1036F TOBACCO NON-USER: CPT | Performed by: FAMILY MEDICINE

## 2025-05-12 PROCEDURE — 3023F SPIROM DOC REV: CPT | Performed by: FAMILY MEDICINE

## 2025-05-12 PROCEDURE — 3074F SYST BP LT 130 MM HG: CPT | Performed by: FAMILY MEDICINE

## 2025-05-12 PROCEDURE — 1123F ACP DISCUSS/DSCN MKR DOCD: CPT | Performed by: FAMILY MEDICINE

## 2025-05-12 PROCEDURE — G8427 DOCREV CUR MEDS BY ELIG CLIN: HCPCS | Performed by: FAMILY MEDICINE

## 2025-05-12 PROCEDURE — 1159F MED LIST DOCD IN RCRD: CPT | Performed by: FAMILY MEDICINE

## 2025-05-12 PROCEDURE — 1090F PRES/ABSN URINE INCON ASSESS: CPT | Performed by: FAMILY MEDICINE

## 2025-05-12 PROCEDURE — 3078F DIAST BP <80 MM HG: CPT | Performed by: FAMILY MEDICINE

## 2025-05-12 PROCEDURE — G8399 PT W/DXA RESULTS DOCUMENT: HCPCS | Performed by: FAMILY MEDICINE

## 2025-05-12 PROCEDURE — 3017F COLORECTAL CA SCREEN DOC REV: CPT | Performed by: FAMILY MEDICINE

## 2025-05-12 PROCEDURE — G8417 CALC BMI ABV UP PARAM F/U: HCPCS | Performed by: FAMILY MEDICINE

## 2025-05-12 RX ORDER — TRAMADOL HYDROCHLORIDE 50 MG/1
50 TABLET ORAL EVERY 6 HOURS PRN
Qty: 120 TABLET | Refills: 0 | Status: SHIPPED | OUTPATIENT
Start: 2025-05-12 | End: 2025-06-11

## 2025-05-12 RX ORDER — ALENDRONATE SODIUM 70 MG/1
70 TABLET ORAL
Qty: 12 TABLET | Refills: 1 | Status: SHIPPED | OUTPATIENT
Start: 2025-05-12

## 2025-05-12 SDOH — ECONOMIC STABILITY: FOOD INSECURITY: WITHIN THE PAST 12 MONTHS, YOU WORRIED THAT YOUR FOOD WOULD RUN OUT BEFORE YOU GOT MONEY TO BUY MORE.: NEVER TRUE

## 2025-05-12 SDOH — ECONOMIC STABILITY: FOOD INSECURITY: WITHIN THE PAST 12 MONTHS, THE FOOD YOU BOUGHT JUST DIDN'T LAST AND YOU DIDN'T HAVE MONEY TO GET MORE.: NEVER TRUE

## 2025-05-12 ASSESSMENT — PATIENT HEALTH QUESTIONNAIRE - PHQ9
1. LITTLE INTEREST OR PLEASURE IN DOING THINGS: NOT AT ALL
3. TROUBLE FALLING OR STAYING ASLEEP: NEARLY EVERY DAY
5. POOR APPETITE OR OVEREATING: NOT AT ALL
8. MOVING OR SPEAKING SO SLOWLY THAT OTHER PEOPLE COULD HAVE NOTICED. OR THE OPPOSITE, BEING SO FIGETY OR RESTLESS THAT YOU HAVE BEEN MOVING AROUND A LOT MORE THAN USUAL: NOT AT ALL
SUM OF ALL RESPONSES TO PHQ QUESTIONS 1-9: 5
SUM OF ALL RESPONSES TO PHQ QUESTIONS 1-9: 5
2. FEELING DOWN, DEPRESSED OR HOPELESS: SEVERAL DAYS
10. IF YOU CHECKED OFF ANY PROBLEMS, HOW DIFFICULT HAVE THESE PROBLEMS MADE IT FOR YOU TO DO YOUR WORK, TAKE CARE OF THINGS AT HOME, OR GET ALONG WITH OTHER PEOPLE: NOT DIFFICULT AT ALL
4. FEELING TIRED OR HAVING LITTLE ENERGY: SEVERAL DAYS
SUM OF ALL RESPONSES TO PHQ QUESTIONS 1-9: 5
7. TROUBLE CONCENTRATING ON THINGS, SUCH AS READING THE NEWSPAPER OR WATCHING TELEVISION: NOT AT ALL
6. FEELING BAD ABOUT YOURSELF - OR THAT YOU ARE A FAILURE OR HAVE LET YOURSELF OR YOUR FAMILY DOWN: NOT AT ALL
SUM OF ALL RESPONSES TO PHQ QUESTIONS 1-9: 5
9. THOUGHTS THAT YOU WOULD BE BETTER OFF DEAD, OR OF HURTING YOURSELF: NOT AT ALL

## 2025-05-12 NOTE — PROGRESS NOTES
Name: Mavis Watkins  : 1955         Chief Complaint:     Chief Complaint   Patient presents with    Chronic Pain       History of Present Illness:      Mavis Watkins is a 70 y.o.  female who presents with Chronic Pain      HPI    Low back pain overall doing decently well. Uses tramadol which does help and she tolerates it well.    PAD - legs do get tired but she pushes herself and still keeps going. Sore area L 4th toe r/t hitting it on screen door a bunch of times. Claudication pain is better than it had been a long time ago. Continues rx as directed.    Still smoking. Stopped wellbutrin d/t dry mouth.     Osteoporosis, never started fosamax. Wasn't aware how bad bone density was.    Medical History:     Patient Active Problem List   Diagnosis    Dysthymia    Amaurosis fugax    History of laser assisted in situ keratomileusis    Panlobular emphysema (HCC)    Essential hypertension    Atherosclerosis of native artery of left lower extremity with intermittent claudication    Age-related osteoporosis without current pathological fracture       Medications:       Prior to Admission medications    Medication Sig Start Date End Date Taking? Authorizing Provider   traMADol (ULTRAM) 50 MG tablet Take 1 tablet by mouth every 6 hours as needed for Pain for up to 30 days. Max Daily Amount: 200 mg 25 Yes Maddie Pandya DO   alendronate (FOSAMAX) 70 MG tablet Take 1 tablet by mouth every 7 days 25  Yes Maddie Pandya DO   cilostazol (PLETAL) 100 MG tablet Take 1 tablet by mouth 2 times daily 25  Yes Maddie Pandya DO   pantoprazole (PROTONIX) 20 MG tablet Take 1 tablet by mouth  daily 25  Yes Maddie Pandya DO   oxyBUTYnin (DITROPAN XL) 10 MG extended release tablet Take 1 tablet by mouth daily 1/3/25  Yes Geovanni Cruz DO   cyclobenzaprine (FLEXERIL) 10 MG tablet Take 1 tablet by mouth 3 times daily as needed for Muscle spasms 10/30/24  Yes Maddie Pandya DO

## 2025-05-12 NOTE — PATIENT INSTRUCTIONS
Please start taking fosamax/alendronate once a week. Also take caltrate twice a day.   For the fosamax: Administer first thing in the morning and >=30 minutes before the first food, beverage (except plain water), or other medication(s) of the day. Do not take with mineral water or with other beverages. Patients should be instructed to stay upright (not to lie down) for >=30 minutes and until after first food of the day (to reduce esophageal irritation).

## 2025-05-15 PROBLEM — M81.0 AGE-RELATED OSTEOPOROSIS WITHOUT CURRENT PATHOLOGICAL FRACTURE: Status: ACTIVE | Noted: 2025-05-15

## 2025-07-20 PROBLEM — Z80.0 FAMILY HISTORY OF COLON CANCER: Status: ACTIVE | Noted: 2025-07-21

## 2025-07-20 PROBLEM — Z86.010 SURVEILLANCE DUE TO PRIOR COLONIC NEOPLASIA: Status: ACTIVE | Noted: 2025-07-21

## 2025-07-21 ENCOUNTER — AMBULATORY SURGICAL CENTER (OUTPATIENT)
Dept: URBAN - METROPOLITAN AREA SURGERY 12 | Facility: SURGERY | Age: 70
End: 2025-07-21
Payer: MEDICARE

## 2025-07-21 ENCOUNTER — OFFICE (OUTPATIENT)
Dept: URBAN - METROPOLITAN AREA PATHOLOGY 2 | Facility: PATHOLOGY | Age: 70
End: 2025-07-21
Payer: MEDICARE

## 2025-07-21 VITALS
HEART RATE: 77 BPM | SYSTOLIC BLOOD PRESSURE: 92 MMHG | RESPIRATION RATE: 17 BRPM | TEMPERATURE: 97.2 F | RESPIRATION RATE: 17 BRPM | SYSTOLIC BLOOD PRESSURE: 91 MMHG | SYSTOLIC BLOOD PRESSURE: 86 MMHG | RESPIRATION RATE: 9 BRPM | OXYGEN SATURATION: 88 % | DIASTOLIC BLOOD PRESSURE: 47 MMHG | OXYGEN SATURATION: 97 % | OXYGEN SATURATION: 88 % | OXYGEN SATURATION: 96 % | HEART RATE: 74 BPM | RESPIRATION RATE: 18 BRPM | HEART RATE: 77 BPM | OXYGEN SATURATION: 94 % | DIASTOLIC BLOOD PRESSURE: 56 MMHG | SYSTOLIC BLOOD PRESSURE: 93 MMHG | OXYGEN SATURATION: 99 % | HEART RATE: 81 BPM | HEART RATE: 74 BPM | DIASTOLIC BLOOD PRESSURE: 54 MMHG | SYSTOLIC BLOOD PRESSURE: 92 MMHG | HEART RATE: 80 BPM | RESPIRATION RATE: 14 BRPM | HEART RATE: 75 BPM | HEART RATE: 73 BPM | SYSTOLIC BLOOD PRESSURE: 98 MMHG | DIASTOLIC BLOOD PRESSURE: 66 MMHG | SYSTOLIC BLOOD PRESSURE: 93 MMHG | DIASTOLIC BLOOD PRESSURE: 51 MMHG | DIASTOLIC BLOOD PRESSURE: 68 MMHG | TEMPERATURE: 97.2 F | SYSTOLIC BLOOD PRESSURE: 94 MMHG | OXYGEN SATURATION: 98 % | RESPIRATION RATE: 7 BRPM | RESPIRATION RATE: 12 BRPM | DIASTOLIC BLOOD PRESSURE: 56 MMHG | DIASTOLIC BLOOD PRESSURE: 52 MMHG | RESPIRATION RATE: 9 BRPM | SYSTOLIC BLOOD PRESSURE: 96 MMHG | DIASTOLIC BLOOD PRESSURE: 73 MMHG | SYSTOLIC BLOOD PRESSURE: 104 MMHG | DIASTOLIC BLOOD PRESSURE: 58 MMHG | DIASTOLIC BLOOD PRESSURE: 51 MMHG | SYSTOLIC BLOOD PRESSURE: 89 MMHG | SYSTOLIC BLOOD PRESSURE: 80 MMHG | RESPIRATION RATE: 18 BRPM | DIASTOLIC BLOOD PRESSURE: 54 MMHG | DIASTOLIC BLOOD PRESSURE: 73 MMHG | SYSTOLIC BLOOD PRESSURE: 90 MMHG | SYSTOLIC BLOOD PRESSURE: 126 MMHG | RESPIRATION RATE: 6 BRPM | WEIGHT: 130 LBS | SYSTOLIC BLOOD PRESSURE: 120 MMHG | HEART RATE: 63 BPM | DIASTOLIC BLOOD PRESSURE: 49 MMHG | SYSTOLIC BLOOD PRESSURE: 120 MMHG | DIASTOLIC BLOOD PRESSURE: 53 MMHG | RESPIRATION RATE: 12 BRPM | HEART RATE: 79 BPM | HEART RATE: 82 BPM | DIASTOLIC BLOOD PRESSURE: 66 MMHG | OXYGEN SATURATION: 99 % | HEART RATE: 75 BPM | HEART RATE: 66 BPM | HEART RATE: 67 BPM | HEART RATE: 70 BPM | RESPIRATION RATE: 6 BRPM | HEART RATE: 63 BPM | RESPIRATION RATE: 14 BRPM | WEIGHT: 130 LBS | DIASTOLIC BLOOD PRESSURE: 58 MMHG | HEIGHT: 61 IN | RESPIRATION RATE: 8 BRPM | OXYGEN SATURATION: 98 % | HEART RATE: 73 BPM | SYSTOLIC BLOOD PRESSURE: 88 MMHG | DIASTOLIC BLOOD PRESSURE: 49 MMHG | HEIGHT: 61 IN | RESPIRATION RATE: 7 BRPM | HEART RATE: 81 BPM | SYSTOLIC BLOOD PRESSURE: 88 MMHG | DIASTOLIC BLOOD PRESSURE: 47 MMHG | HEART RATE: 80 BPM | SYSTOLIC BLOOD PRESSURE: 90 MMHG | HEART RATE: 79 BPM | DIASTOLIC BLOOD PRESSURE: 53 MMHG | SYSTOLIC BLOOD PRESSURE: 94 MMHG | SYSTOLIC BLOOD PRESSURE: 98 MMHG | DIASTOLIC BLOOD PRESSURE: 52 MMHG | HEART RATE: 71 BPM | SYSTOLIC BLOOD PRESSURE: 91 MMHG | SYSTOLIC BLOOD PRESSURE: 104 MMHG | OXYGEN SATURATION: 95 % | SYSTOLIC BLOOD PRESSURE: 86 MMHG | SYSTOLIC BLOOD PRESSURE: 89 MMHG | SYSTOLIC BLOOD PRESSURE: 96 MMHG | HEART RATE: 66 BPM | SYSTOLIC BLOOD PRESSURE: 80 MMHG | SYSTOLIC BLOOD PRESSURE: 126 MMHG | HEART RATE: 82 BPM | OXYGEN SATURATION: 96 % | DIASTOLIC BLOOD PRESSURE: 68 MMHG | HEART RATE: 67 BPM | OXYGEN SATURATION: 95 % | RESPIRATION RATE: 8 BRPM | HEART RATE: 71 BPM | OXYGEN SATURATION: 97 % | HEART RATE: 70 BPM | OXYGEN SATURATION: 94 %

## 2025-07-21 DIAGNOSIS — K57.30 DIVERTICULOSIS OF LARGE INTESTINE WITHOUT PERFORATION OR ABS: ICD-10-CM

## 2025-07-21 DIAGNOSIS — K64.8 OTHER HEMORRHOIDS: ICD-10-CM

## 2025-07-21 DIAGNOSIS — K63.5 POLYP OF COLON: ICD-10-CM

## 2025-07-21 DIAGNOSIS — Z80.0 FAMILY HISTORY OF MALIGNANT NEOPLASM OF DIGESTIVE ORGANS: ICD-10-CM

## 2025-07-21 DIAGNOSIS — Z09 ENCOUNTER FOR FOLLOW-UP EXAMINATION AFTER COMPLETED TREATMEN: ICD-10-CM

## 2025-07-21 DIAGNOSIS — K62.1 RECTAL POLYP: ICD-10-CM

## 2025-07-21 DIAGNOSIS — Z86.0101 PERSONAL HISTORY OF ADENOMATOUS AND SERRATED COLON POLYPS: ICD-10-CM

## 2025-07-21 DIAGNOSIS — K64.4 RESIDUAL HEMORRHOIDAL SKIN TAGS: ICD-10-CM

## 2025-07-21 PROCEDURE — 45380 COLONOSCOPY AND BIOPSY: CPT | Mod: PT | Performed by: INTERNAL MEDICINE

## 2025-07-21 PROCEDURE — 88305 TISSUE EXAM BY PATHOLOGIST: CPT | Performed by: PATHOLOGY

## 2025-08-08 RX ORDER — ATORVASTATIN CALCIUM 40 MG/1
TABLET, FILM COATED ORAL
Qty: 14 TABLET | Refills: 0 | Status: SHIPPED | OUTPATIENT
Start: 2025-08-08

## 2025-08-08 RX ORDER — OXYBUTYNIN CHLORIDE 10 MG/1
10 TABLET, EXTENDED RELEASE ORAL DAILY
Qty: 14 TABLET | Refills: 0 | Status: SHIPPED | OUTPATIENT
Start: 2025-08-08 | End: 2025-08-22

## 2025-08-26 DIAGNOSIS — M54.50 CHRONIC BILATERAL LOW BACK PAIN WITHOUT SCIATICA: ICD-10-CM

## 2025-08-26 DIAGNOSIS — G89.29 CHRONIC BILATERAL LOW BACK PAIN WITHOUT SCIATICA: ICD-10-CM

## 2025-08-26 RX ORDER — OXYBUTYNIN CHLORIDE 10 MG/1
10 TABLET, EXTENDED RELEASE ORAL DAILY
Qty: 90 TABLET | Refills: 0 | Status: SHIPPED | OUTPATIENT
Start: 2025-08-26 | End: 2025-11-24

## 2025-08-26 RX ORDER — ATORVASTATIN CALCIUM 40 MG/1
TABLET, FILM COATED ORAL
Qty: 90 TABLET | Refills: 0 | Status: SHIPPED | OUTPATIENT
Start: 2025-08-26

## 2025-08-26 RX ORDER — TRAMADOL HYDROCHLORIDE 50 MG/1
50 TABLET ORAL EVERY 6 HOURS PRN
Qty: 120 TABLET | Refills: 0 | Status: SHIPPED | OUTPATIENT
Start: 2025-08-26 | End: 2025-09-25